# Patient Record
Sex: MALE | Race: WHITE | NOT HISPANIC OR LATINO | Employment: FULL TIME | URBAN - METROPOLITAN AREA
[De-identification: names, ages, dates, MRNs, and addresses within clinical notes are randomized per-mention and may not be internally consistent; named-entity substitution may affect disease eponyms.]

---

## 2017-05-24 ENCOUNTER — OFFICE VISIT - HEALTHEAST (OUTPATIENT)
Dept: FAMILY MEDICINE | Facility: CLINIC | Age: 42
End: 2017-05-24

## 2017-05-24 DIAGNOSIS — B35.1 ONYCHOMYCOSIS: ICD-10-CM

## 2017-05-24 DIAGNOSIS — L50.9 HIVES: ICD-10-CM

## 2017-05-24 ASSESSMENT — MIFFLIN-ST. JEOR: SCORE: 2220.95

## 2017-05-27 ENCOUNTER — COMMUNICATION - HEALTHEAST (OUTPATIENT)
Dept: FAMILY MEDICINE | Facility: CLINIC | Age: 42
End: 2017-05-27

## 2017-06-01 ENCOUNTER — COMMUNICATION - HEALTHEAST (OUTPATIENT)
Dept: FAMILY MEDICINE | Facility: CLINIC | Age: 42
End: 2017-06-01

## 2017-06-01 DIAGNOSIS — M54.50 LOWER BACK PAIN: ICD-10-CM

## 2017-06-01 DIAGNOSIS — Z91.09 ENVIRONMENTAL ALLERGIES: ICD-10-CM

## 2017-06-08 ENCOUNTER — OFFICE VISIT - HEALTHEAST (OUTPATIENT)
Dept: ALLERGY | Facility: CLINIC | Age: 42
End: 2017-06-08

## 2017-06-08 ENCOUNTER — RECORDS - HEALTHEAST (OUTPATIENT)
Dept: ADMINISTRATIVE | Facility: OTHER | Age: 42
End: 2017-06-08

## 2017-06-08 DIAGNOSIS — R05.9 COUGH: ICD-10-CM

## 2017-06-08 DIAGNOSIS — R10.9 ABDOMINAL PAIN: ICD-10-CM

## 2017-06-08 DIAGNOSIS — L50.9 URTICARIA, UNSPECIFIED: ICD-10-CM

## 2017-06-08 LAB
CREAT SERPL-MCNC: 1.06 MG/DL (ref 0.7–1.3)
GFR SERPL CREATININE-BSD FRML MDRD: >60 ML/MIN/1.73M2

## 2017-06-08 ASSESSMENT — MIFFLIN-ST. JEOR: SCORE: 2189.2

## 2017-06-12 ENCOUNTER — COMMUNICATION - HEALTHEAST (OUTPATIENT)
Dept: ALLERGY | Facility: CLINIC | Age: 42
End: 2017-06-12

## 2017-06-12 ENCOUNTER — AMBULATORY - HEALTHEAST (OUTPATIENT)
Dept: ALLERGY | Facility: CLINIC | Age: 42
End: 2017-06-12

## 2017-06-12 DIAGNOSIS — E55.9 VITAMIN D DEFICIENCY: ICD-10-CM

## 2017-06-12 LAB
GLIADIN IGA SER-ACNC: 2.1 U/ML
GLIADIN IGG SER-ACNC: <0.4 U/ML
IGA SERPL-MCNC: 292 MG/DL (ref 65–400)
TTG IGA SER-ACNC: 0.5 U/ML
TTG IGG SER-ACNC: <0.6 U/ML

## 2017-06-14 ENCOUNTER — COMMUNICATION - HEALTHEAST (OUTPATIENT)
Dept: FAMILY MEDICINE | Facility: CLINIC | Age: 42
End: 2017-06-14

## 2017-06-21 ENCOUNTER — COMMUNICATION - HEALTHEAST (OUTPATIENT)
Dept: ALLERGY | Facility: CLINIC | Age: 42
End: 2017-06-21

## 2018-03-28 ENCOUNTER — COMMUNICATION - HEALTHEAST (OUTPATIENT)
Dept: FAMILY MEDICINE | Facility: CLINIC | Age: 43
End: 2018-03-28

## 2018-04-06 ENCOUNTER — RECORDS - HEALTHEAST (OUTPATIENT)
Dept: ADMINISTRATIVE | Facility: OTHER | Age: 43
End: 2018-04-06

## 2018-07-24 ENCOUNTER — RECORDS - HEALTHEAST (OUTPATIENT)
Dept: ADMINISTRATIVE | Facility: OTHER | Age: 43
End: 2018-07-24

## 2019-04-17 ENCOUNTER — OFFICE VISIT - HEALTHEAST (OUTPATIENT)
Dept: FAMILY MEDICINE | Facility: CLINIC | Age: 44
End: 2019-04-17

## 2019-04-17 ENCOUNTER — HOSPITAL ENCOUNTER (OUTPATIENT)
Dept: LAB | Age: 44
Setting detail: SPECIMEN
Discharge: HOME | End: 2019-04-17

## 2019-04-17 DIAGNOSIS — E66.812 CLASS 2 OBESITY DUE TO EXCESS CALORIES WITHOUT SERIOUS COMORBIDITY WITH BODY MASS INDEX (BMI) OF 38.0 TO 38.9 IN ADULT: ICD-10-CM

## 2019-04-17 DIAGNOSIS — Z01.818 PREOP GENERAL PHYSICAL EXAM: ICD-10-CM

## 2019-04-17 DIAGNOSIS — E66.09 CLASS 2 OBESITY DUE TO EXCESS CALORIES WITHOUT SERIOUS COMORBIDITY WITH BODY MASS INDEX (BMI) OF 38.0 TO 38.9 IN ADULT: ICD-10-CM

## 2019-04-17 DIAGNOSIS — B35.1 ONYCHOMYCOSIS DUE TO DERMATOPHYTE: ICD-10-CM

## 2019-04-17 DIAGNOSIS — I10 ESSENTIAL HYPERTENSION: ICD-10-CM

## 2019-04-17 DIAGNOSIS — H26.9 CATARACT OF BOTH EYES, UNSPECIFIED CATARACT TYPE: ICD-10-CM

## 2019-04-17 LAB
ALBUMIN SERPL-MCNC: 4 G/DL (ref 3.5–5)
ALBUMIN UR-MCNC: NEGATIVE MG/DL
ALP SERPL-CCNC: 62 U/L (ref 45–120)
ALT SERPL W P-5'-P-CCNC: 28 U/L (ref 0–45)
ANION GAP SERPL CALCULATED.3IONS-SCNC: 10 MMOL/L (ref 5–18)
APPEARANCE UR: CLEAR
AST SERPL W P-5'-P-CCNC: 18 U/L (ref 0–40)
BASOPHILS # BLD AUTO: 0.1 THOU/UL (ref 0–0.2)
BASOPHILS NFR BLD AUTO: 1 % (ref 0–2)
BILIRUB SERPL-MCNC: 0.9 MG/DL (ref 0–1)
BILIRUB UR QL STRIP: NEGATIVE
BUN SERPL-MCNC: 11 MG/DL (ref 8–22)
CALCIUM SERPL-MCNC: 9.6 MG/DL (ref 8.5–10.5)
CHLORIDE BLD-SCNC: 104 MMOL/L (ref 98–107)
CO2 SERPL-SCNC: 26 MMOL/L (ref 22–31)
COLOR UR AUTO: YELLOW
CREAT SERPL-MCNC: 0.9 MG/DL (ref 0.7–1.3)
CREAT UR-MCNC: 141.4 MG/DL
EOSINOPHIL # BLD AUTO: 0.2 THOU/UL (ref 0–0.4)
EOSINOPHIL NFR BLD AUTO: 3 % (ref 0–6)
ERYTHROCYTE [DISTWIDTH] IN BLOOD BY AUTOMATED COUNT: 10.7 % (ref 11–14.5)
GFR SERPL CREATININE-BSD FRML MDRD: >60 ML/MIN/1.73M2
GLUCOSE BLD-MCNC: 89 MG/DL (ref 70–125)
GLUCOSE UR STRIP-MCNC: NEGATIVE MG/DL
HBA1C MFR BLD: 5.4 % (ref 3.5–6)
HCT VFR BLD AUTO: 48.6 % (ref 40–54)
HGB BLD-MCNC: 16.7 G/DL (ref 14–18)
HGB UR QL STRIP: NEGATIVE
KETONES UR STRIP-MCNC: NEGATIVE MG/DL
LEUKOCYTE ESTERASE UR QL STRIP: NEGATIVE
LYMPHOCYTES # BLD AUTO: 1.6 THOU/UL (ref 0.8–4.4)
LYMPHOCYTES NFR BLD AUTO: 20 % (ref 20–40)
MCH RBC QN AUTO: 34 PG (ref 27–34)
MCHC RBC AUTO-ENTMCNC: 34.4 G/DL (ref 32–36)
MCV RBC AUTO: 99 FL (ref 80–100)
MICROALBUMIN UR-MCNC: 1.48 MG/DL (ref 0–1.99)
MICROALBUMIN/CREAT UR: 10.5 MG/G
MONOCYTES # BLD AUTO: 0.6 THOU/UL (ref 0–0.9)
MONOCYTES NFR BLD AUTO: 8 % (ref 2–10)
NEUTROPHILS # BLD AUTO: 5.6 THOU/UL (ref 2–7.7)
NEUTROPHILS NFR BLD AUTO: 69 % (ref 50–70)
NITRATE UR QL: NEGATIVE
PH UR STRIP: 6 [PH] (ref 5–8)
PLATELET # BLD AUTO: 287 THOU/UL (ref 140–440)
PMV BLD AUTO: 6.7 FL (ref 7–10)
POTASSIUM BLD-SCNC: 4.2 MMOL/L (ref 3.5–5)
PROT SERPL-MCNC: 7.2 G/DL (ref 6–8)
RBC # BLD AUTO: 4.92 MILL/UL (ref 4.4–6.2)
SODIUM SERPL-SCNC: 140 MMOL/L (ref 136–145)
SP GR UR STRIP: 1.02 (ref 1–1.03)
TSH SERPL DL<=0.005 MIU/L-ACNC: 1.04 UIU/ML (ref 0.3–5)
UROBILINOGEN UR STRIP-ACNC: NORMAL
WBC: 8 THOU/UL (ref 4–11)

## 2019-04-17 ASSESSMENT — MIFFLIN-ST. JEOR: SCORE: 2285.02

## 2019-04-18 ENCOUNTER — COMMUNICATION - HEALTHEAST (OUTPATIENT)
Dept: FAMILY MEDICINE | Facility: CLINIC | Age: 44
End: 2019-04-18

## 2019-04-18 LAB
ATRIAL RATE - MUSE: 73 BPM
DIASTOLIC BLOOD PRESSURE - MUSE: NORMAL MMHG
INTERPRETATION ECG - MUSE: NORMAL
P AXIS - MUSE: 6 DEGREES
PR INTERVAL - MUSE: 140 MS
QRS DURATION - MUSE: 82 MS
QT - MUSE: 414 MS
QTC - MUSE: 456 MS
R AXIS - MUSE: -7 DEGREES
SYSTOLIC BLOOD PRESSURE - MUSE: NORMAL MMHG
T AXIS - MUSE: 2 DEGREES
VENTRICULAR RATE- MUSE: 73 BPM

## 2019-05-01 ENCOUNTER — OFFICE VISIT - HEALTHEAST (OUTPATIENT)
Dept: FAMILY MEDICINE | Facility: CLINIC | Age: 44
End: 2019-05-01

## 2019-05-01 DIAGNOSIS — I10 ESSENTIAL HYPERTENSION: ICD-10-CM

## 2019-05-03 ENCOUNTER — COMMUNICATION - HEALTHEAST (OUTPATIENT)
Dept: FAMILY MEDICINE | Facility: CLINIC | Age: 44
End: 2019-05-03

## 2019-05-20 ENCOUNTER — OFFICE VISIT - HEALTHEAST (OUTPATIENT)
Dept: FAMILY MEDICINE | Facility: CLINIC | Age: 44
End: 2019-05-20

## 2019-05-20 DIAGNOSIS — I10 BENIGN ESSENTIAL HYPERTENSION: ICD-10-CM

## 2019-09-26 ENCOUNTER — COMMUNICATION - HEALTHEAST (OUTPATIENT)
Dept: FAMILY MEDICINE | Facility: CLINIC | Age: 44
End: 2019-09-26

## 2019-09-26 DIAGNOSIS — I10 ESSENTIAL HYPERTENSION: ICD-10-CM

## 2020-02-17 ENCOUNTER — OFFICE VISIT (OUTPATIENT)
Dept: URGENT CARE | Facility: URGENT CARE | Age: 45
End: 2020-02-17
Payer: COMMERCIAL

## 2020-02-17 VITALS
HEIGHT: 75 IN | DIASTOLIC BLOOD PRESSURE: 106 MMHG | TEMPERATURE: 102.8 F | HEART RATE: 123 BPM | BODY MASS INDEX: 35.93 KG/M2 | OXYGEN SATURATION: 99 % | SYSTOLIC BLOOD PRESSURE: 156 MMHG | WEIGHT: 289 LBS

## 2020-02-17 DIAGNOSIS — J10.1 INFLUENZA A: Primary | ICD-10-CM

## 2020-02-17 PROCEDURE — 99203 OFFICE O/P NEW LOW 30 MIN: CPT | Performed by: FAMILY MEDICINE

## 2020-02-17 RX ORDER — OSELTAMIVIR PHOSPHATE 75 MG/1
75 CAPSULE ORAL 2 TIMES DAILY
Qty: 10 CAPSULE | Refills: 0 | Status: SHIPPED | OUTPATIENT
Start: 2020-02-17 | End: 2020-02-22

## 2020-02-17 RX ORDER — IBUPROFEN 200 MG
200 TABLET ORAL EVERY 4 HOURS PRN
COMMUNITY
End: 2021-08-11

## 2020-02-17 ASSESSMENT — MIFFLIN-ST. JEOR: SCORE: 2281.53

## 2020-02-18 NOTE — PROGRESS NOTES
Subjective: Patient got sick 2 days ago with high fevers and cough and aches, [facility] where they tested him negative for strep and negative for influenza and recommended he come here.  He feels pretty poorly but his temperature has come down.  He lives alone.  He was recently in [place] and was around people who seem to have the flu.    Objective: NAD.  Vitals are stable.  ENT is normal.  Neck is normal.  Lungs are clear.  Heart is regular without murmurs.  Abdomen benign.    Assessment and plan: These of the symptoms him for influenza A that we are seeing everybody coming in with today.  I think he should take Tamiflu.  I think the [facility] was a false negative.  Discussed what to expect.

## 2021-05-27 NOTE — PROGRESS NOTES
Preoperative Exam    Scheduled Procedure: Bilateral Cataract  Surgery Date:  5/1/2019   Surgery Location: [facility] Fax: [phone]    Surgeon:  Dr. Nelson    Assessment/Plan:     1. Preop general physical exam    - HM1(CBC and Differential)  - HM1 (CBC with Diff)    2. Cataract of both eyes, unspecified cataract type  Scheduled for May 1, but patient will postpone to the second week of May for better blood pressure control.    3. Essential hypertension  Started on lisinopril 10 mg daily, check blood pressure 3-4 times a week, follow-up in about 2 or 3 weeks.  - Comprehensive Metabolic Panel  - Thyroid Cascade  - Microalbumin, Random Urine  - Urinalysis-UC if Indicated  - Electrocardiogram Perform - Clinic; Future  - lisinopril (PRINIVIL,ZESTRIL) 10 MG tablet; Take 1 tablet (10 mg total) by mouth daily.  Dispense: 90 tablet; Refill: 3  - Electrocardiogram Perform - Clinic    4. Class 2 obesity due to excess calories without serious comorbidity with body mass index (BMI) of 38.0 to 38.9 in adult  - Glycosylated Hemoglobin A1c  - Thyroid Cascade    5. Onychomycosis due to dermatophyte  - ciclopirox (PENLAC) 8 % solution; Apply over nail and surrounding skin. Apply daily over previous coat. After seven (7) days, may remove with alcohol and continue cycle.  Dispense: 6.6 mL; Refill: 3     Left ventricular hypertrophy on EKG done here and independently reviewed, likely related to chronic hypertension.    Surgical Procedure Risk: Low (reported cardiac risk generally < 1%)  Have you had prior anesthesia?: Yes  Have you or any family members had a previous anesthesia reaction:  No  Do you or any family members have a history of a clotting or bleeding disorder?: No  Cardiac Risk Assessment: no increased risk for major cardiac complications    Patient is not approved for surgery Because of blood pressure elevation, he was started on lisinopril 10 mg daily, we will follow-up in about 2 to 3 weeks for a  recheck      Functional Status: Independent  Patient plans to recover at home alone.     Subjective:      Vikas Lees is a 43 y.o. male who presents for a preoperative consultation.  Scheduled to have bilateral cataract surgery May 1, as noticed progressive decreased vision over time.  But blood pressure moderately elevated with a new diagnosis of hypertension, no other symptoms.  Would like to resume ciclopirox for onychomycosis topical treatment.    All other systems reviewed and are negative, other than those listed in the HPI.    Pertinent History  Do you have difficulty breathing or chest pain after walking up a flight of stairs: No  History of obstructive sleep apnea: No  Steroid use in the last 6 months: No  Frequent Aspirin/NSAID use: No  Prior Blood Transfusion: No  Prior Blood Transfusion Reaction: No  If for some reason prior to, during or after the procedure, if it is medically indicated, would you be willing to have a blood transfusion?:  There is no transfusion refusal.    Current Outpatient Medications   Medication Sig Dispense Refill     albuterol (PROAIR HFA;PROVENTIL HFA;VENTOLIN HFA) 90 mcg/actuation inhaler Inhale 2 puffs every 6 (six) hours as needed for wheezing. 1 Inhaler 0     ibuprofen (ADVIL,MOTRIN) 200 MG tablet Take 200 mg by mouth every 6 (six) hours as needed for pain.       multivitamin with minerals (THERA-M) 9 mg iron-400 mcg Tab tablet Take 1 tablet by mouth daily.       ciclopirox (PENLAC) 8 % solution Apply over nail and surrounding skin. Apply daily over previous coat. After seven (7) days, may remove with alcohol and continue cycle. 6.6 mL 3     diphenhydrAMINE (BENADRYL) 25 mg capsule Take 25 mg by mouth every 6 (six) hours as needed for itching.       lisinopril (PRINIVIL,ZESTRIL) 10 MG tablet Take 1 tablet (10 mg total) by mouth daily. 90 tablet 3     mometasone-formoterol (DULERA) 100-5 mcg/actuation HFAA inhaler Inhale 2 puffs 2 (two) times a day. 1 Inhaler 1     No  "current facility-administered medications for this visit.         No Known Allergies    Patient Active Problem List   Diagnosis     Mild Single Episode Major Depression       No past medical history on file.    Past Surgical History:   Procedure Laterality Date     INGUINAL HERNIA REPAIR         Social History     Socioeconomic History     Marital status: Single     Spouse name: Not on file     Number of children: Not on file     Years of education: Not on file     Highest education level: Not on file   Occupational History     Not on file   Social Needs     Financial resource strain: Not on file     Food insecurity:     Worry: Not on file     Inability: Not on file     Transportation needs:     Medical: Not on file     Non-medical: Not on file   Tobacco Use     Smoking status: Never Smoker     Smokeless tobacco: Never Used   Substance and Sexual Activity     Alcohol use: Not on file     Drug use: Not on file     Sexual activity: Not on file   Lifestyle     Physical activity:     Days per week: Not on file     Minutes per session: Not on file     Stress: Not on file   Relationships     Social connections:     Talks on phone: Not on file     Gets together: Not on file     Attends Buddhist service: Not on file     Active member of club or organization: Not on file     Attends meetings of clubs or organizations: Not on file     Relationship status: Not on file     Intimate partner violence:     Fear of current or ex partner: Not on file     Emotionally abused: Not on file     Physically abused: Not on file     Forced sexual activity: Not on file   Other Topics Concern     Not on file   Social History Narrative     Not on file       Patient Care Team:  Ranjeet Franco MD as PCP - General (Family Medicine)          Objective:     Vitals:    04/17/19 1021   BP: (!) 145/100   Pulse: 83   Temp: 98.5  F (36.9  C)   TempSrc: Oral   SpO2: 97%   Weight: (!) 297 lb (134.7 kg)   Height: 6' 1.25\" (1.861 m) "         Physical Exam:  Physical Examination: General appearance - alert, well appearing, and in no distress  Mental status - alert, oriented to person, place, and time  Eyes -opacification of the lens bilateral, otherwise pupils equal and reactive, extraocular eye movements intact  Ears - bilateral TM's and external ear canals normal  Nose - normal and patent, no erythema, discharge or polyps  Mouth - mucous membranes moist, pharynx normal without lesions  Neck - supple, no significant adenopathy  Lymphatics - no palpable lymphadenopathy, no hepatosplenomegaly  Chest - clear to auscultation, no wheezes, rales or rhonchi, symmetric air entry  Heart - normal rate, regular rhythm, normal S1, S2, no murmurs, rubs, clicks or gallops  Abdomen - soft, nontender, nondistended, no masses or organomegaly  Back exam - full range of motion, no tenderness, palpable spasm or pain on motion  Neurological - alert, oriented, normal speech, no focal findings or movement disorder noted  Musculoskeletal - no joint tenderness, deformity or swelling  Extremities - peripheral pulses normal, no pedal edema, no clubbing or cyanosis  Skin -onychomycosis of toenails, normal coloration and turgor, no rashes, no suspicious skin lesions noted    There are no Patient Instructions on file for this visit.    EKG: Independently reviewed normal sinus rhythm, with left ventricular hypertrophy changes.    Labs:  Recent Results (from the past 120 hour(s))   Electrocardiogram Perform - Clinic    Collection Time: 04/17/19 11:19 AM   Result Value Ref Range    SYSTOLIC BLOOD PRESSURE  mmHg    DIASTOLIC BLOOD PRESSURE  mmHg    VENTRICULAR RATE 73 BPM    ATRIAL RATE 73 BPM    P-R INTERVAL 140 ms    QRS DURATION 82 ms    Q-T INTERVAL 414 ms    QTC CALCULATION (BEZET) 456 ms    P Axis 6 degrees    R AXIS -7 degrees    T AXIS 2 degrees    MUSE DIAGNOSIS       Normal sinus rhythm  Voltage criteria for left ventricular hypertrophy  Abnormal ECG  No previous ECGs  available  Confirmed by BERONICA MCKINLEY MD LOC:[facility] ([id number]) on 4/18/2019 12:04:01 PM     Glycosylated Hemoglobin A1c    Collection Time: 04/17/19 11:34 AM   Result Value Ref Range    Hemoglobin A1c 5.4 3.5 - 6.0 %   Comprehensive Metabolic Panel    Collection Time: 04/17/19 11:34 AM   Result Value Ref Range    Sodium 140 136 - 145 mmol/L    Potassium 4.2 3.5 - 5.0 mmol/L    Chloride 104 98 - 107 mmol/L    CO2 26 22 - 31 mmol/L    Anion Gap, Calculation 10 5 - 18 mmol/L    Glucose 89 70 - 125 mg/dL    BUN 11 8 - 22 mg/dL    Creatinine 0.90 0.70 - 1.30 mg/dL    GFR MDRD Af Amer >60 >60 mL/min/1.73m2    GFR MDRD Non Af Amer >60 >60 mL/min/1.73m2    Bilirubin, Total 0.9 0.0 - 1.0 mg/dL    Calcium 9.6 8.5 - 10.5 mg/dL    Protein, Total 7.2 6.0 - 8.0 g/dL    Albumin 4.0 3.5 - 5.0 g/dL    Alkaline Phosphatase 62 45 - 120 U/L    AST 18 0 - 40 U/L    ALT 28 0 - 45 U/L   Thyroid [place]    Collection Time: 04/17/19 11:34 AM   Result Value Ref Range    TSH 1.04 0.30 - 5.00 uIU/mL   HM1 (CBC with Diff)    Collection Time: 04/17/19 11:34 AM   Result Value Ref Range    WBC 8.0 4.0 - 11.0 thou/uL    RBC 4.92 4.40 - 6.20 mill/uL    Hemoglobin 16.7 14.0 - 18.0 g/dL    Hematocrit 48.6 40.0 - 54.0 %    MCV 99 80 - 100 fL    MCH 34.0 27.0 - 34.0 pg    MCHC 34.4 32.0 - 36.0 g/dL    RDW 10.7 (L) 11.0 - 14.5 %    Platelets 287 140 - 440 thou/uL    MPV 6.7 (L) 7.0 - 10.0 fL    Neutrophils % 69 50 - 70 %    Lymphocytes % 20 20 - 40 %    Monocytes % 8 2 - 10 %    Eosinophils % 3 0 - 6 %    Basophils % 1 0 - 2 %    Neutrophils Absolute 5.6 2.0 - 7.7 thou/uL    Lymphocytes Absolute 1.6 0.8 - 4.4 thou/uL    Monocytes Absolute 0.6 0.0 - 0.9 thou/uL    Eosinophils Absolute 0.2 0.0 - 0.4 thou/uL    Basophils Absolute 0.1 0.0 - 0.2 thou/uL   Microalbumin, Random Urine    Collection Time: 04/17/19 11:43 AM   Result Value Ref Range    Microalbumin, Random Urine 1.48 0.00 - 1.99 mg/dL    Creatinine, Urine 141.4 mg/dL    Microalbumin/Creatinine Ratio  Random Urine 10.5 <=19.9 mg/g   Urinalysis-UC if Indicated    Collection Time: 04/17/19 11:43 AM   Result Value Ref Range    Color, UA Yellow Colorless, Yellow, Straw, Light Yellow    Clarity, UA Clear Clear    Glucose, UA Negative Negative    Bilirubin, UA Negative Negative    Ketones, UA Negative Negative    Specific Gravity, UA 1.020 1.005 - 1.030    Blood, UA Negative Negative    pH, UA 6.0 5.0 - 8.0    Protein, UA Negative Negative mg/dL    Urobilinogen, UA 0.2 E.U./dL 0.2 E.U./dL, 1.0 E.U./dL    Nitrite, UA Negative Negative    Leukocytes, UA Negative Negative         There is no immunization history on file for this patient.        Electronically signed by Ranjeet Franco MD 04/18/19 10:15 AM

## 2021-05-28 NOTE — PROGRESS NOTES
OFFICE VISIT - FAMILY MEDICINE     ASSESSMENT AND PLAN     1. Essential hypertension  losartan-hydrochlorothiazide (HYZAAR) 50-12.5 mg per tablet   Uncontrolled hypertension, recent URI with cough, will discontinue lisinopril, start losartan HCTZ, possible side effect discussed, check blood pressure 3-4 times a week, keep hydrated, follow-up in about 3 or 4 weeks for a recheck.    CHIEF COMPLAINT   Hypertension (follow up)    HPI   Vikas Lees is a 43 y.o. male.  No MIIC - Needs TDAP     He was started on lisinopril 10 mg about 3 weeks ago for hypertension, has been taken every day, but blood pressure has not been good control, he did have cold symptoms  recently with cough with some concern about possible lisinopril induced cough.  Denies any chest pain, shortness of breath or dizziness.  He was planning to have his cataract surgery done next week based on blood pressure reading.      Review of Systems As per HPI, otherwise negative.    OBJECTIVE   BP (!) 145/95 (Patient Site: Right Arm, Patient Position: Sitting, Cuff Size: Adult Large)   Pulse 91   Wt (!) 295 lb 8 oz (134 kg)   SpO2 98%   BMI 38.72 kg/m    Physical Exam   Constitutional: He is oriented to person, place, and time. He appears well-developed and well-nourished.   HENT:   Head: Normocephalic and atraumatic.   Neck: Normal range of motion. Neck supple. No JVD present. No tracheal deviation present. No thyromegaly present.   Cardiovascular: Normal rate, regular rhythm, normal heart sounds and intact distal pulses. Exam reveals no gallop and no friction rub.   No murmur heard.  Pulmonary/Chest: Effort normal and breath sounds normal. No respiratory distress. He has no wheezes. He has no rales.   Musculoskeletal: He exhibits no edema or tenderness.   Lymphadenopathy:     He has no cervical adenopathy.   Neurological: He is alert and oriented to person, place, and time. Coordination normal.   Psychiatric: He has a normal mood and affect. Judgment  and thought content normal.       PFSH     Family History   Problem Relation Age of Onset     Arthritis Mother      Breast cancer Mother      Stroke Mother      Diabetes Maternal Grandfather      Stroke Maternal Grandfather      Social History     Socioeconomic History     Marital status: Single     Spouse name: Not on file     Number of children: Not on file     Years of education: Not on file     Highest education level: Not on file   Occupational History     Not on file   Social Needs     Financial resource strain: Not on file     Food insecurity:     Worry: Not on file     Inability: Not on file     Transportation needs:     Medical: Not on file     Non-medical: Not on file   Tobacco Use     Smoking status: Never Smoker     Smokeless tobacco: Never Used   Substance and Sexual Activity     Alcohol use: Not on file     Drug use: Not on file     Sexual activity: Not on file   Lifestyle     Physical activity:     Days per week: Not on file     Minutes per session: Not on file     Stress: Not on file   Relationships     Social connections:     Talks on phone: Not on file     Gets together: Not on file     Attends Yarsanism service: Not on file     Active member of club or organization: Not on file     Attends meetings of clubs or organizations: Not on file     Relationship status: Not on file     Intimate partner violence:     Fear of current or ex partner: Not on file     Emotionally abused: Not on file     Physically abused: Not on file     Forced sexual activity: Not on file   Other Topics Concern     Not on file   Social History Narrative     Not on file     Relevant history was reviewed with the patient today, unless noted in HPI, nothing is pertinent for this visit.  PMS     Patient Active Problem List    Diagnosis Date Noted     Mild Single Episode Major Depression      Overview Note:     Created by Conversion         Past Surgical History:   Procedure Laterality Date     INGUINAL HERNIA REPAIR          RESULTS/CONSULTS (Lab/Rad)   No results found for this or any previous visit (from the past 168 hour(s)).  No results found.  MEDICATIONS     Current Outpatient Medications on File Prior to Visit   Medication Sig Dispense Refill     albuterol (PROAIR HFA;PROVENTIL HFA;VENTOLIN HFA) 90 mcg/actuation inhaler Inhale 2 puffs every 6 (six) hours as needed for wheezing. 1 Inhaler 0     ciclopirox (PENLAC) 8 % solution Apply over nail and surrounding skin. Apply daily over previous coat. After seven (7) days, may remove with alcohol and continue cycle. 6.6 mL 3     multivitamin with minerals (THERA-M) 9 mg iron-400 mcg Tab tablet Take 1 tablet by mouth daily.       diphenhydrAMINE (BENADRYL) 25 mg capsule Take 25 mg by mouth every 6 (six) hours as needed for itching.       ibuprofen (ADVIL,MOTRIN) 200 MG tablet Take 200 mg by mouth every 6 (six) hours as needed for pain.       mometasone-formoterol (DULERA) 100-5 mcg/actuation HFAA inhaler Inhale 2 puffs 2 (two) times a day. 1 Inhaler 1     No current facility-administered medications on file prior to visit.        HEALTH MAINTENANCE / SCREENING   PHQ-2 Total Score: 0 (4/17/2019 11:00 AM)  , PHQ-9 Total Score: 2 (4/17/2019 11:00 AM)  ,ANDRES 7 Total Score: 0 (4/17/2019 11:00 AM)      There is no immunization history on file for this patient.  Health Maintenance   Topic     TDAP ADULT ONE TIME DOSE      DEPRESSION FOLLOW UP      ADVANCE DIRECTIVES DISCUSSED WITH PATIENT      TD 18+ HE      INFLUENZA VACCINE RULE BASED        Ranjeet Franco MD  Family Medicine, [facility]     This note was dictated using a voice recognition software.  Any grammatical or context distortion are unintentional and inherent to the software.

## 2021-05-28 NOTE — PROGRESS NOTES
OFFICE VISIT - FAMILY MEDICINE     ASSESSMENT AND PLAN     1. Benign essential hypertension     Benign essential hypertension, controlled with Hyzaar 50-12.5 mg daily, blood pressure slowly improving, continue current management continue healthy lifestyle changes weight loss program, okay for cataract surgery, follow-up in 1 month after the procedures for a general physical exam and blood pressure recheck.    CHIEF COMPLAINT   Follow-up (hypertension)    HPI   Vikas Lees is a 43 y.o. male.  No MIIC - Needs TDAP  Following up on hypertension, initially started on lisinopril 10 mg, but no improvement of blood pressure reading, lisinopril was discontinued, patient is currently taking losartan 50-12.5 mg daily, his blood pressure is coming down.  Denies any headaches dizziness or chest pain.  Scheduled for cataract surgery in a week, has already had his preop physical.    Review of Systems As per HPI, otherwise negative.    OBJECTIVE   /85   Pulse 82   Wt (!) 296 lb 12 oz (134.6 kg)   SpO2 97%   BMI 38.88 kg/m    Physical Exam   Constitutional: He is oriented to person, place, and time. He appears well-developed and well-nourished.   HENT:   Head: Normocephalic and atraumatic.   Neck: Normal range of motion. Neck supple. No JVD present. No tracheal deviation present. No thyromegaly present.   Cardiovascular: Normal rate, regular rhythm, normal heart sounds and intact distal pulses. Exam reveals no gallop and no friction rub.   No murmur heard.  Pulmonary/Chest: Effort normal and breath sounds normal. No respiratory distress. He has no wheezes. He has no rales.   Musculoskeletal: He exhibits no edema or tenderness.   Lymphadenopathy:     He has no cervical adenopathy.   Neurological: He is alert and oriented to person, place, and time. Coordination normal.   Psychiatric: He has a normal mood and affect. Judgment and thought content normal.       PFSH     Family History   Problem Relation Age of Onset      Arthritis Mother      Breast cancer Mother      Stroke Mother      Diabetes Maternal Grandfather      Stroke Maternal Grandfather      Social History     Socioeconomic History     Marital status: Single     Spouse name: Not on file     Number of children: Not on file     Years of education: Not on file     Highest education level: Not on file   Occupational History     Not on file   Social Needs     Financial resource strain: Not on file     Food insecurity:     Worry: Not on file     Inability: Not on file     Transportation needs:     Medical: Not on file     Non-medical: Not on file   Tobacco Use     Smoking status: Never Smoker     Smokeless tobacco: Never Used   Substance and Sexual Activity     Alcohol use: Not on file     Drug use: Not on file     Sexual activity: Not on file   Lifestyle     Physical activity:     Days per week: Not on file     Minutes per session: Not on file     Stress: Not on file   Relationships     Social connections:     Talks on phone: Not on file     Gets together: Not on file     Attends [employer]an service: Not on file     Active member of club or organization: Not on file     Attends meetings of clubs or organizations: Not on file     Relationship status: Not on file     Intimate partner violence:     Fear of current or ex partner: Not on file     Emotionally abused: Not on file     Physically abused: Not on file     Forced sexual activity: Not on file   Other Topics Concern     Not on file   Social History Narrative     Not on file     Relevant history was reviewed with the patient today, unless noted in HPI, nothing is pertinent for this visit.  [facility]     Patient Active Problem List    Diagnosis Date Noted     Benign essential hypertension 05/20/2019     Mild Single Episode Major Depression      Overview Note:     Created by Conversion         Past Surgical History:   Procedure Laterality Date     INGUINAL HERNIA REPAIR         RESULTS/CONSULTS (Lab/Rad)   No results found for this  or any previous visit (from the past 168 hour(s)).  No results found.  MEDICATIONS     Current Outpatient Medications on File Prior to Visit   Medication Sig Dispense Refill     albuterol (PROAIR HFA;PROVENTIL HFA;VENTOLIN HFA) 90 mcg/actuation inhaler Inhale 2 puffs every 6 (six) hours as needed for wheezing. 1 Inhaler 0     ciclopirox (PENLAC) 8 % solution Apply over nail and surrounding skin. Apply daily over previous coat. After seven (7) days, may remove with alcohol and continue cycle. 6.6 mL 3     diphenhydrAMINE (BENADRYL) 25 mg capsule Take 25 mg by mouth every 6 (six) hours as needed for itching.       ibuprofen (ADVIL,MOTRIN) 200 MG tablet Take 200 mg by mouth every 6 (six) hours as needed for pain.       losartan-hydrochlorothiazide (HYZAAR) 50-12.5 mg per tablet Take 1 tablet by mouth daily. 30 tablet 3     mometasone-formoterol (DULERA) 100-5 mcg/actuation HFAA inhaler Inhale 2 puffs 2 (two) times a day. 1 Inhaler 1     multivitamin with minerals (THERA-M) 9 mg iron-400 mcg Tab tablet Take 1 tablet by mouth daily.       No current facility-administered medications on file prior to visit.        HEALTH MAINTENANCE / SCREENING   PHQ-2 Total Score: 0 (4/17/2019 11:00 AM)  , PHQ-9 Total Score: 2 (4/17/2019 11:00 AM)  ,ANDRES 7 Total Score: 0 (4/17/2019 11:00 AM)      There is no immunization history on file for this patient.  Health Maintenance   Topic     TDAP ADULT ONE TIME DOSE      DEPRESSION FOLLOW UP      ADVANCE DIRECTIVES DISCUSSED WITH PATIENT      TD 18+ HE      INFLUENZA VACCINE RULE BASED        Ranjeet Franco MD  Family Medicine, [facility]     This note was dictated using a voice recognition software.  Any grammatical or context distortion are unintentional and inherent to the software.

## 2021-05-28 NOTE — TELEPHONE ENCOUNTER
Informed melissa gloria PCP is out of clinic today and will return Monday. She states can wait until PCP returns. Thank you.

## 2021-05-28 NOTE — TELEPHONE ENCOUNTER
Orders being requested: if pt is ready for surgery following his b/p follow up exam on 5.1.2019  Reason service is needed/diagnosis: [facility] looking to see if they should schedule surgery for pt  When are orders needed by: when available.  Where to send Orders: Fax: [phone]  Okay to leave detailed message?  Yes

## 2021-05-28 NOTE — TELEPHONE ENCOUNTER
Spoke with [facility] post-poned surgery due to elevated BP. Patient's follow-up appointment is 5/20/19 with Dr Franco, they will re-schedule surgery after follow-ip.

## 2021-05-28 NOTE — TELEPHONE ENCOUNTER
Blood pressure was still elevated at his follow-up last week, plan was to readjust his medication, postpone the surgery until the end of May and see he patient 1 week before that visit.

## 2021-05-31 VITALS — HEIGHT: 74 IN | BODY MASS INDEX: 36.19 KG/M2 | WEIGHT: 282 LBS

## 2021-05-31 VITALS — HEIGHT: 74 IN | BODY MASS INDEX: 35.29 KG/M2 | WEIGHT: 275 LBS

## 2021-06-01 NOTE — TELEPHONE ENCOUNTER
Refill Approved    Rx renewed per Medication Renewal Policy. Medication was last renewed on 5/1/19.    Yesenia Vaughan, [facility] Triage/Med Refill 9/27/2019     Requested Prescriptions   Pending Prescriptions Disp Refills     losartan-hydrochlorothiazide (HYZAAR) 50-12.5 mg per tablet [Pharmacy Med Name: LOSARTAN/HCTZ 50/12.5MG TABLETS] 30 tablet 0     Sig: TAKE 1 TABLET BY MOUTH DAILY       Diuretics/Combination Diuretics Refill Protocol  Passed - 9/26/2019  3:14 AM        Passed - Visit with PCP or prescribing provider visit in past 12 months     Last office visit with prescriber/PCP: 5/20/2019 Ranjeet Franco MD OR same dept: 5/20/2019 Ranjeet Franco MD OR same specialty: 5/20/2019 Ranjeet Franco MD  Last physical: Visit date not found Last MTM visit: Visit date not found   Next visit within 3 mo: Visit date not found  Next physical within 3 mo: Visit date not found  Prescriber OR PCP: Ranjeet Franco MD  Last diagnosis associated with med order: 1. Essential hypertension  - losartan-hydrochlorothiazide (HYZAAR) 50-12.5 mg per tablet [Pharmacy Med Name: LOSARTAN/HCTZ 50/12.5MG TABLETS]; Take 1 tablet by mouth daily.  Dispense: 30 tablet; Refill: 0    If protocol passes may refill for 12 months if within 3 months of last provider visit (or a total of 15 months).             Passed - Serum Potassium in past 12 months      Lab Results   Component Value Date    Potassium 4.2 04/17/2019             Passed - Serum Sodium in past 12 months      Lab Results   Component Value Date    Sodium 140 04/17/2019             Passed - Blood pressure on file in past 12 months     BP Readings from Last 1 Encounters:   05/20/19 144/85             Passed - Serum Creatinine in past 12 months      Creatinine   Date Value Ref Range Status   04/17/2019 0.90 0.70 - 1.30 mg/dL Final

## 2021-06-02 VITALS — WEIGHT: 297 LBS | HEIGHT: 73 IN | BODY MASS INDEX: 39.36 KG/M2

## 2021-06-03 VITALS — BODY MASS INDEX: 38.72 KG/M2 | WEIGHT: 295.5 LBS

## 2021-06-03 VITALS — WEIGHT: 296.75 LBS | BODY MASS INDEX: 38.88 KG/M2

## 2021-06-10 NOTE — PROGRESS NOTES
"ASSESSMENT & PLAN:  1. Hives  Signs and symptoms of worsening hives discussed, patient will return if he fails to improve, otherwise take prednisone taper as discussed, could also do Benadryl as needed.  - predniSONE (DELTASONE) 10 mg tablet; Take 4 tabs daily for 3 days, then 3 tabs daily for 3 days, then 2 tabs daily for 3 days, then 1 tab daily for 3 days, then stop.  Dispense: 30 tablet; Refill: 0  - IgE Allergen Panel Food ($$$)  - IgE Allergen Panel Respiratory with Total IgE ($$$)    2. Onychomycosis  - ciclopirox (PENLAC) 8 % solution; Apply over nail and surrounding skin. Apply daily over previous coat. After seven (7) days, may remove with alcohol and continue cycle.  Dispense: 6.6 mL; Refill: 3      Orders Placed This Encounter   Procedures     IgE Allergen Panel Food ($$$)     IgE Allergen Panel Respiratory with Total IgE ($$$)     There are no discontinued medications.    No Follow-up on file.    CHIEF COMPLAINT:  Chief Complaint   Patient presents with     Rash     \"HIVES\" SYSEMIC X 4 WKS - \"WORSE X 2.5 WKS\"     Foot Problem     \"NAIL FUNGUS\" X 5 YRS       HISTORY OF PRESENT ILLNESS:  Vikas is a 41 y.o. male presenting to the clinic today with complaints of a rash and nail fungus.     Rash: He has a hives like rash on his entire body. It started on the back of neck four weeks ago but has now spread to the rest of his body. It is slightly pruritic. He can still breath fine, but he feels as though it is starting to affect his mouth. He is traveling this weekend and inquires if he can takes something to calm it down. He has tried to remove things from his diet that he could be having an allergic reaction to, but he has not been able to figure out what is causing it. There is not anything that has changed in his diet or lifestyle that he can think of. He has sensitive skin but has tried changing soaps. With how systemic the rash is, he does not think it would have been anything topical that would have " "caused this. He has been taking Benadryl. He would be interested in doing some blood work to test an allergy panel.     Onychomycosis: He has a fungal infection on his toenails and inquires if there is anything he could try for it. He has used over the counter treatments, but they did not help much. His nails have started to crack. The fungus is present in all toes of his right foot and in the great toe of his left foot.     REVIEW OF SYSTEMS:   All other systems are negative.    [facility]:  He has a place in the [facility] that he is going to on Friday.    TOBACCO USE:  History   Smoking Status     Never Smoker   Smokeless Tobacco     Never Used       VITALS:  Vitals:    05/24/17 0819   BP: 134/86   Patient Site: Right Arm   Pulse: 64   Resp: 13   Temp: 97.9  F (36.6  C)   TempSrc: Oral   Weight: (!) 282 lb (127.9 kg)   Height: 6' 1.5\" (1.867 m)     Wt Readings from Last 3 Encounters:   05/24/17 (!) 282 lb (127.9 kg)   12/08/16 (!) 270 lb (122.5 kg)     Body mass index is 36.7 kg/(m^2).    PHYSICAL EXAM:  GENERAL:  Patient alert, in no acute distress.  ENT:  Oropharynx normal. Normal lips, gums and teeth.    RESP:  Clear to auscultation without crackles, wheezes or distress.  Normal respiratory effort.   CV:  Regular rate and rhythm without murmurs, rubs or gallops.    SKIN:  Diffuse hives of different sizes across entire body. Thickening of the toenails - all toes on right, great toe on left.   NEURO:  CN II-XII intact, motor & sensory function all intact.  DTR and reflexes normal.  PSYCHIATRIC:  Alert & oriented with normal mood and affect.  Good judgment and insight.      ADDITIONAL HISTORY SUMMARIZED (2): None.  DECISION TO OBTAIN EXTRA INFORMATION (1): None.   RADIOLOGY TESTS (1): None.  LABS (1): Labs ordered.   MEDICINE TESTS (1): None.  INDEPENDENT REVIEW (2 each): None.     The visit lasted a total of 12 minutes face to face with the patient. Over 50% of the time was spent counseling and educating the patient " about his rash.    I, Bandar Hou, am scribing for and in the presence of, Dr. Franco.    I, Dr. Franco, personally performed the services described in this documentation, as scribed by Bandar Hou in my presence, and it is both accurate and complete.    Dragon dictation was used for this note.  Speech recognition errors are a possibility.    MEDICATIONS:  Current Outpatient Prescriptions   Medication Sig Dispense Refill     diphenhydrAMINE (BENADRYL) 25 mg capsule Take 25 mg by mouth every 6 (six) hours as needed for itching.       ibuprofen (ADVIL,MOTRIN) 200 MG tablet Take 200 mg by mouth every 6 (six) hours as needed for pain.       ciclopirox (PENLAC) 8 % solution Apply over nail and surrounding skin. Apply daily over previous coat. After seven (7) days, may remove with alcohol and continue cycle. 6.6 mL 3     methylPREDNISolone (MEDROL DOSEPACK) 4 mg tablet follow package directions 21 tablet 0     predniSONE (DELTASONE) 10 mg tablet Take 4 tabs daily for 3 days, then 3 tabs daily for 3 days, then 2 tabs daily for 3 days, then 1 tab daily for 3 days, then stop. 30 tablet 0     No current facility-administered medications for this visit.        Total data points: 1      There are no Patient Instructions on file for this visit.

## 2021-06-11 NOTE — PROGRESS NOTES
Chief complaint: Hives, cough    Of present illness: This is a pleasant 41-year-old gentleman who is here today for evaluation of hives.  He states for the past 4 weeks he has been experiencing highs.  He states they were initially head to toe.  He states they began at night and he cannot think of any specific allergic trigger.  He denies any recent illness but states over the last week he has had an increased cough and runny nose.  He has a distant history of a pneumonia and states when he gets sick symptoms tend to go right to the chest and he would develop this cough.  He states initially by his primary care doctor he was given a steroid.  This did help and the hives continued to come and go throughout the day but they are not as prominent.  He states they are very itchy.  Benadryl does not seem to make much of a difference in the symptoms.  He does travel frequently.  He has been to the [facility] in [place] and hives were present there as well.  He states he has not changed anything at home.  He has tried eliminating some foods from his diet, however, the rash continues to occur.  He notes that ibuprofen and alcohol seems to worsen the rash.  He also notes that heat and exercise seem to worsen the rash.  He states he has had hives before when he has been hot and sweaty.  He states it is very intermittent and minor, however.  No bruising to the rash.  He does have some intermittent abdominal pain.    With the cough, he notes the cough is very coarse.  He will cough to the point of almost throwing up.  He has been prescribed albuterol in the past but does not have a current inhaler.  He has never been diagnosed with asthma.  He states he does cough in his sleep.  No fevers.  He has felt some wheezing as well in some shortness of breath.  He does have some sneezing.  He also some drainage.  Specific IgE testing was done by his primary care provider.  Total IgE was 138 the testing was negative.  Food testing was also  "sent which was negative.    Past medical history: Broken bones, hernia, pneumonia    Social history: He works as a ,, no pets, no recent changes at home, lives in a condo, no central air, no mold or water damage, non-smoker    Family history: Mother has metal allergy    Review of Systems performed as above and the remainder is negative.      Current Outpatient Prescriptions:      ciclopirox (PENLAC) 8 % solution, Apply over nail and surrounding skin. Apply daily over previous coat. After seven (7) days, may remove with alcohol and continue cycle., Disp: 6.6 mL, Rfl: 3     ibuprofen (ADVIL,MOTRIN) 200 MG tablet, Take 200 mg by mouth every 6 (six) hours as needed for pain., Disp: , Rfl:      albuterol (PROAIR HFA;PROVENTIL HFA;VENTOLIN HFA) 90 mcg/actuation inhaler, Inhale 2 puffs every 6 (six) hours as needed for wheezing., Disp: 1 Inhaler, Rfl: 0     diphenhydrAMINE (BENADRYL) 25 mg capsule, Take 25 mg by mouth every 6 (six) hours as needed for itching., Disp: , Rfl:      mometasone-formoterol (DULERA) 100-5 mcg/actuation HFAA inhaler, Inhale 2 puffs 2 (two) times a day., Disp: 1 Inhaler, Rfl: 1    No Known Allergies    /90  Pulse 74  Ht 6' 1.5\" (1.867 m)  Wt (!) 275 lb (124.7 kg)  BMI 35.79 kg/m2  Gen: Pleasant male not in acute distress  HEENT: Eyes no erythema of the bulbar or palpebral conjunctiva, no edema. Ears: TMs well visualized, no effusions. Nose: No congestion, mucosa normal. Mouth: Throat clear, no lip or tongue edema.   Cardiac: Regular rate and rhythm, no murmurs, rubs or gallops  Respiratory: Clear to auscultation bilaterally, no adventitious breath sounds  Lymph: No supraclavicular or cervical lymphadenopathy  Skin: No rashes or lesions  Psych: Alert and oriented times 3    FENO: 13    Spirometry was performed.  Only able to obtain to reproducible matches due to the patient coughing.  FEV1 to FVC ratio 87%.  FEV1 3.6 L or 79% predicted.  FVC 4.1 L or 72% of " predicted.    Impression report and plan:  1.  Acute urticaria    I am not sure as to the specific etiology, however, in many cases of hives, it is not allergic in origin.  Allergy testing has been negative.  I would recommend a systemic workup including TSH, CBC with differential, tryptase, celiac disease panel, vitamin D and H. Pylori.  I went over specific triggers for hives including alcohol, stress, illness.  I would like him to use twice daily loratidine.  Notify if symptoms not controlled.    2.  Cough    I am concerned about non-allergic asthma.  I would like him to start Dulera 100/5 2 puffs twice daily.  Albuterol as needed.  If cough persists, he should notify.  Okay to stop Dulera in 1 month if symptoms are improved.  If symptoms return, return for follow-up.

## 2021-06-19 NOTE — LETTER
Letter by Ranjeet Franco MD at      Author: Ranjeet Franco MD Service: -- Author Type: --    Filed:  Encounter Date: 4/18/2019 Status: (Other)         Vikas Lees  [address] [place] [address]             April 18, 2019         Dear Mr. Lees,    Below are the results from your recent visit:    Resulted Orders   Glycosylated Hemoglobin A1c   Result Value Ref Range    Hemoglobin A1c 5.4 3.5 - 6.0 %   Comprehensive Metabolic Panel   Result Value Ref Range    Sodium 140 136 - 145 mmol/L    Potassium 4.2 3.5 - 5.0 mmol/L    Chloride 104 98 - 107 mmol/L    CO2 26 22 - 31 mmol/L    Anion Gap, Calculation 10 5 - 18 mmol/L    Glucose 89 70 - 125 mg/dL    BUN 11 8 - 22 mg/dL    Creatinine 0.90 0.70 - 1.30 mg/dL    GFR MDRD Af Amer >60 >60 mL/min/1.73m2    GFR MDRD Non Af Amer >60 >60 mL/min/1.73m2    Bilirubin, Total 0.9 0.0 - 1.0 mg/dL    Calcium 9.6 8.5 - 10.5 mg/dL    Protein, Total 7.2 6.0 - 8.0 g/dL    Albumin 4.0 3.5 - 5.0 g/dL    Alkaline Phosphatase 62 45 - 120 U/L    AST 18 0 - 40 U/L    ALT 28 0 - 45 U/L    Narrative    Fasting Glucose reference range is 70-99 mg/dL per  American Diabetes Association (ADA) guidelines.   Thyroid Cascade   Result Value Ref Range    TSH 1.04 0.30 - 5.00 uIU/mL   Microalbumin, Random Urine   Result Value Ref Range    Microalbumin, Random Urine 1.48 0.00 - 1.99 mg/dL    Creatinine, Urine 141.4 mg/dL    Microalbumin/Creatinine Ratio Random Urine 10.5 <=19.9 mg/g    Narrative    Microalbumin, Random Urine  <2.0 mg/dL . . . . . . . . Normal  3.0-30.0 mg/dL . . . . . . Microalbuminuria  >30.0 mg/dL . . . . . .  . Clinical Proteinuria  Microalbumin/Creatinine Ratio, Random Urine  <20 mg/g . . . . .. . . . Normal   mg/g . . . . . . . Microalbuminuria  >300 mg/g . . . . . . . . Clinical Proteinuria   Urinalysis-UC if Indicated   Result Value Ref Range    Color, UA Yellow Colorless, Yellow, Straw, Light Yellow    Clarity, UA Clear Clear    Glucose,  UA Negative Negative    Bilirubin, UA Negative Negative    Ketones, UA Negative Negative    Specific Gravity, UA 1.020 1.005 - 1.030    Blood, UA Negative Negative    pH, UA 6.0 5.0 - 8.0    Protein, UA Negative Negative mg/dL    Urobilinogen, UA 0.2 E.U./dL 0.2 E.U./dL, 1.0 E.U./dL    Nitrite, UA Negative Negative    Leukocytes, UA Negative Negative    Narrative    Microscopic not indicated  UC not indicated   HM1 (CBC with Diff)   Result Value Ref Range    WBC 8.0 4.0 - 11.0 thou/uL    RBC 4.92 4.40 - 6.20 mill/uL    Hemoglobin 16.7 14.0 - 18.0 g/dL    Hematocrit 48.6 40.0 - 54.0 %    MCV 99 80 - 100 fL    MCH 34.0 27.0 - 34.0 pg    MCHC 34.4 32.0 - 36.0 g/dL    RDW 10.7 (L) 11.0 - 14.5 %    Platelets 287 140 - 440 thou/uL    MPV 6.7 (L) 7.0 - 10.0 fL    Neutrophils % 69 50 - 70 %    Lymphocytes % 20 20 - 40 %    Monocytes % 8 2 - 10 %    Eosinophils % 3 0 - 6 %    Basophils % 1 0 - 2 %    Neutrophils Absolute 5.6 2.0 - 7.7 thou/uL    Lymphocytes Absolute 1.6 0.8 - 4.4 thou/uL    Monocytes Absolute 0.6 0.0 - 0.9 thou/uL    Eosinophils Absolute 0.2 0.0 - 0.4 thou/uL    Basophils Absolute 0.1 0.0 - 0.2 thou/uL       Your recent blood test results  were all within acceptable limit.  Please do not hesitate to contact me if you have any question.  Dr Franco    Please call with questions or contact us using [employer].    Sincerely,        Electronically signed by Ranjeet Franco MD

## 2021-08-11 ENCOUNTER — OFFICE VISIT (OUTPATIENT)
Dept: FAMILY MEDICINE | Facility: CLINIC | Age: 46
End: 2021-08-11
Payer: COMMERCIAL

## 2021-08-11 VITALS
WEIGHT: 315 LBS | HEIGHT: 75 IN | HEART RATE: 80 BPM | SYSTOLIC BLOOD PRESSURE: 138 MMHG | DIASTOLIC BLOOD PRESSURE: 112 MMHG | OXYGEN SATURATION: 98 % | BODY MASS INDEX: 39.17 KG/M2

## 2021-08-11 DIAGNOSIS — S83.412D SPRAIN OF MEDIAL COLLATERAL LIGAMENT OF LEFT KNEE, SUBSEQUENT ENCOUNTER: Primary | ICD-10-CM

## 2021-08-11 DIAGNOSIS — I10 BENIGN ESSENTIAL HYPERTENSION: ICD-10-CM

## 2021-08-11 PROCEDURE — 99214 OFFICE O/P EST MOD 30 MIN: CPT | Performed by: STUDENT IN AN ORGANIZED HEALTH CARE EDUCATION/TRAINING PROGRAM

## 2021-08-11 RX ORDER — LISINOPRIL 5 MG/1
5 TABLET ORAL DAILY
Qty: 30 TABLET | Refills: 1 | Status: SHIPPED | OUTPATIENT
Start: 2021-08-11 | End: 2021-10-11

## 2021-08-11 RX ORDER — AMLODIPINE BESYLATE 10 MG/1
10 TABLET ORAL DAILY
Qty: 30 TABLET | Refills: 1 | Status: SHIPPED | OUTPATIENT
Start: 2021-08-11 | End: 2021-10-11

## 2021-08-11 ASSESSMENT — MIFFLIN-ST. JEOR: SCORE: 2397.18

## 2021-08-11 NOTE — PROGRESS NOTES
ASSESSMENT AND PLAN     Vikas was seen today for follow up.    Diagnoses and all orders for this visit:    Sprain of medial collateral ligament of left knee, subsequent encounter  Patient presents with two days of left knee pain after falling down stairs. XR negative for fracture of effusion in the ED. Patient continues to have pain with ambulation and medial and lateral stress. No signs of acute ligament or tendon tears on exam, likely medial collateral ligament sprain. Counseled patient on conservative management including RICE with further evaluation including MRI or ortho referral after initial swelling decreases and if pain is not improved with PT. Will send to PT for gentle range of motion exercises.  Will continue tylenol PRN for pain. Patient is amenable to this.  -     Physical Therapy Referral; Future    Benign essential hypertension  Patient has history of HTN but has not been on anti-hypertensives for two years. BP elevated in clinic and in the ER earlier this week. Will increase amlodipine dose and start lisinopril for renal-protective effects given recent elevated creatinine. Will follow up in 4-6 weeks for further management and medication adjustment.   -     amLODIPine (NORVASC) 10 MG tablet; Take 1 tablet (10 mg) by mouth daily  -     lisinopril (ZESTRIL) 5 MG tablet; Take 1 tablet (5 mg) by mouth daily    RTC in 4-6 weeks for follow up of knee pain and HTN or sooner if develops new or worsening symptoms.    Ree Joseph, MS4    I was present with the medical student who participated in the service and in the documentation of this note. I have verified the history and personally performed the physical exam and medical decision making, and have verified the content of the note, which accurately reflects my assessment of the patient and the plan of care.   Caroline Dalal, DO            SUBJECTIVE       Vikas MCGILL Nabeel is a 45 year old  male with a PMH significant for:     Patient Active  "Problem List   Diagnosis   (none) - all problems resolved or deleted     He presents for ER follow up after fall, HTN and elevated creatinine.     On 8/9/21 patient fell down wet wooden stairs and landed on his back. He hit his head but did not have LOC. He presented to [place] ED with left knee pain and left knee XR demonstrated no fracture or dislocation. Acute head pathology was ruled out. Patient has pain 8/10 with medial and lateral stress with walking. He states he is unable to \"bend\" his left knee. He received a knee brace from the ER but can only wear it at night because he states he is unable to bend his knee with it on. Patient denies pain at rest while sitting or lying down.  Patient denies any locking or catching of the joint.  He has been taking tylenol 1000 QID without significant relief. He also has been icing some. He is interested in a MRI to evaluate his left knee further.    Patient also has history of HTN treated previously with lisinopril 10mg and hyzaar 50-12.5 mg. Patient states he stopped taking his meds because of a \"recall\" about two years ago. His BP was found to be 151/111 and he was prescirbed amlodipine 5 mg in the ER on 8/9/21.    Patient also found to have elevated creatinine of 1.37 the ER on 8/9/21. Baseline creatinine appears around 0.9 in 2019.    PMH, Medications and Allergies were reviewed and updated as needed.        REVIEW OF SYSTEMS     Pertinent items are noted in HPI.        OBJECTIVE     Vitals:    08/11/21 0952   BP: (!) 138/112   BP Location: Left arm   Patient Position: Sitting   Cuff Size: Adult Large   Pulse: 80   SpO2: 98%   Weight: 143.2 kg (315 lb 9.6 oz)   Height: 1.905 m (6' 3\")     Body mass index is 39.45 kg/m .      Constitutional: Awake, alert, cooperative, no apparent distress, and appears stated age.  Eyes: Lids and lashes normal, sclera clear, conjunctiva normal.  ENT: Normocephalic, without obvious abnormality, atramatic,   Lungs: No increased work of " breathing, good air exchange, clear to auscultation bilaterally, no crackles or wheezing.  Cardiovascular: Regular rate and rhythm, normal S1 and S2, no S3 or S4, and no murmur noted.  Abdomen: Normal bowel sounds, soft, non-distended, non-tender, no masses palpated, no hepatosplenomegally.  Musculoskeletal: No redness or warmth of the joints. Trace bilateral lower extremity edema to the knees. Swelling and tenderness with medial stress and palpation of left knee. Negative anterior and posterior drawer tests of left knee. Normal varus and valgus strain.  Negative patellar apprehension.  Negative patellar compression.  No pain along the joint space.  Neurologic: Awake, alert, oriented to name, place and time.  Cranial nerves II-XII are grossly intact.    No results found for this or any previous visit (from the past 24 hour(s)).

## 2021-09-02 ENCOUNTER — THERAPY VISIT (OUTPATIENT)
Dept: PHYSICAL THERAPY | Facility: CLINIC | Age: 46
End: 2021-09-02
Attending: STUDENT IN AN ORGANIZED HEALTH CARE EDUCATION/TRAINING PROGRAM
Payer: COMMERCIAL

## 2021-09-02 DIAGNOSIS — S83.412D SPRAIN OF MEDIAL COLLATERAL LIGAMENT OF LEFT KNEE, SUBSEQUENT ENCOUNTER: ICD-10-CM

## 2021-09-02 PROCEDURE — 97110 THERAPEUTIC EXERCISES: CPT | Mod: GP | Performed by: PHYSICAL THERAPIST

## 2021-09-02 PROCEDURE — 97161 PT EVAL LOW COMPLEX 20 MIN: CPT | Mod: GP | Performed by: PHYSICAL THERAPIST

## 2021-09-02 ASSESSMENT — ACTIVITIES OF DAILY LIVING (ADL)
SIT WITH YOUR KNEE BENT: ACTIVITY IS VERY DIFFICULT
SWELLING: THE SYMPTOM AFFECTS MY ACTIVITY MODERATELY
WALK: ACTIVITY IS FAIRLY DIFFICULT
LIMPING: THE SYMPTOM PREVENTS ME FROM ALL DAILY ACTIVITIES
AS_A_RESULT_OF_YOUR_KNEE_INJURY,_HOW_WOULD_YOU_RATE_YOUR_CURRENT_LEVEL_OF_DAILY_ACTIVITY?: SEVERELY ABNORMAL
SQUAT: ACTIVITY IS VERY DIFFICULT
GO UP STAIRS: ACTIVITY IS FAIRLY DIFFICULT
KNEE_ACTIVITY_OF_DAILY_LIVING_SCORE: 28.57
KNEEL ON THE FRONT OF YOUR KNEE: ACTIVITY IS VERY DIFFICULT
PAIN: THE SYMPTOM AFFECTS MY ACTIVITY SEVERELY
STAND: ACTIVITY IS MINIMALLY DIFFICULT
KNEE_ACTIVITY_OF_DAILY_LIVING_SUM: 20
GO DOWN STAIRS: ACTIVITY IS FAIRLY DIFFICULT
STIFFNESS: THE SYMPTOM AFFECTS MY ACTIVITY SEVERELY
RISE FROM A CHAIR: ACTIVITY IS SOMEWHAT DIFFICULT
WEAKNESS: THE SYMPTOM PREVENTS ME FROM ALL DAILY ACTIVITIES
RAW_SCORE: 20
HOW_WOULD_YOU_RATE_THE_CURRENT_FUNCTION_OF_YOUR_KNEE_DURING_YOUR_USUAL_DAILY_ACTIVITIES_ON_A_SCALE_FROM_0_TO_100_WITH_100_BEING_YOUR_LEVEL_OF_KNEE_FUNCTION_PRIOR_TO_YOUR_INJURY_AND_0_BEING_THE_INABILITY_TO_PERFORM_ANY_OF_YOUR_USUAL_DAILY_ACTIVITIES?: 40
GIVING WAY, BUCKLING OR SHIFTING OF KNEE: THE SYMPTOM PREVENTS ME FROM ALL DAILY ACTIVITIES
HOW_WOULD_YOU_RATE_THE_OVERALL_FUNCTION_OF_YOUR_KNEE_DURING_YOUR_USUAL_DAILY_ACTIVITIES?: SEVERELY ABNORMAL

## 2021-09-02 NOTE — PROGRESS NOTES
[facility] Initial Evaluation  9/2/2021     Precautions/Restrictions/MD instructions: eval and treat    Therapist Assessment: Vikas Lees is a 45 year old male patient presenting to Physical Therapy with L knee pain. Patient demonstrates decreased quad activation on L, increased pain with L valgus stress at 0 and 30 degrees, increased TTP to L MCL, pain with double leg squat, decreased L knee extension   . Signs and symptoms are consistent with L MCL sprain. These impairments limit their ability to ambulated without pain, perform stairs without pain, squat without pain. Skilled PT services are necessary in order to reduce impairments and improve independent function.    Subjective History    Injury/Condition Details:  Presenting Complaint L knee pain   Onset Timing/Date 8/192021, (Doctor's referral 8/11/2021)   Mechanism Fall down stairs      Symptom Behavior Details    Primary Symptoms Constant symptoms; worsen with activity, pain (Location: medial side, Quality: Aching/Throbbing and Tender), stiffness, weakness      Denies locking, catching, giving way, or instability. Denies numbness, tingling, changes in sensation. Denies having related symptoms spreading to the L calf.    Worst Pain 8/10 (with bending)   Symptom Provocators Bending, stairs, walking, pivoting squatting    Best Pain 0/10    Symptom Relievers Tylenol, elevated, icing, keep it moving    Time of day dependent? Worse in evening after activity   Recent symptom change? symptoms improving     Prior Testing/Intervention for current condition:  Prior Tests  x-ray   Prior Treatment Brace     Lifestyle & General Medical History:  Employment     Usual physical activities  (within past year) Tennis, soccer, rugby, weight lifting    Orthopaedic History  L foot fracture, neck and back fracture    Medication  High blood pressure    Notable medical history Overweight    Patient goals Get back to normal,  pain free    Patient Reported Health good   Red Flags: (Bold when present) - reviewed the following and denies  Malaise, unexplained weight loss, night pain, fever           PHYSICAL THERAPY KNEE EXAM     Dynamic Movement Screen:  DL Squat: Anterior knee translation, Knee valgus, Hip internal rotation and Improper use of glutes/hips, + pain   1 leg stance:   Right: proprioceptive challenge  Left: proprioceptive challenge    1 leg squat:   Right: proprioceptive challenge, excessive contralateral pelvic drop, excessive femoral IR/ADD and excessive anterior knee excursion  Left: proprioceptive challenge and decreased depth due to pain     Gait: HIP/TRUNK, Trendelenburg bilaterally, Decreased hip extension, KNEE and Knee flexion at initial contact increased on L     (*Indicates patient s pain)  Knee PROM L  R   Hyperextension 2 5   Extension 0 0   Flexion 130 130   (*Indicates patient s pain)    Hip PROM:     L R   IR 15 15   ER 40 40   Martha's - -   Reno - -       Special tests:   L R   Anterior Drawer - -   Posterior Drawer - -   Lachman's - -   Valgus 0 degrees + -   Valgus 30 degrees + -   Varus 0 degrees - -   Varus 30 degrees - -   Alfred's - -   Appley's - -   Lateral Compression - -   Patellar Compression - -   (*Indicates patient's pain)    Resisted Tests Strength (MMT)    L R   Knee Ext 4-/5 4/5   Knee Flex 4/5 4/5   Hip ABD 4-/5 4-/5   Hip Flex 4/5 4/5   Hip EXT 4-/5 4-/5   (*Indicates patient s pain)    Patellar tracking: normal    Other:  Joint Line Swelling: n/a   Knee Joint Effusion (Stroke Test Assessment):  Right: 0  Left: 0  ASSESSMENT/PLAN:  The patient is a 45 year old male with chief complaint of L knee pain.    The patient has the following significant findings with corresponding treatment plan.  Diagnosis 1:  Signs and symptoms consistent with L MCL sprain     Pain -  manual therapy, splint/taping/bracing/orthotics, self management, education and home program  Decreased ROM/flexibility - manual  therapy, therapeutic exercise and home program  Decreased joint mobility - manual therapy, therapeutic exercise and home program  Decreased strength - therapeutic exercise, therapeutic activities and home program  Impaired balance - neuro re-education, therapeutic activities and home program  Decreased proprioception - neuro re-education and therapeutic activities  Impaired gait - gait training and assistive devices  Impaired muscle performance - neuro re-education and home program  Decreased function - therapeutic activities and home program  Impaired posture - neuro re-education, therapeutic activities and home program  Instability -  Therapeutic Activity, Therapeutic Exercise, Neuromuscular Re-education, Splinting/Taping/Bracing/Orthotic, home program      Therapy Evaluation Codes:   1) History comprised of:   Personal factors that impact the plan of care:      None.    Comorbidity factors that impact the plan of care are:      High blood pressure and Overweight.     Medications impacting care: High blood pressure.  2) Examination of Body Systems comprised of:   Body structures and functions that impact the plan of care:      Knee.   Activity limitations that impact the plan of care are:      Lifting, Running, Sports, Squatting/kneeling, Stairs and Walking.  3) Clinical presentation characteristics are:   Stable/Uncomplicated.  4) Decision-Making    Low complexity using standardized patient assessment instrument and/or measureable assessment of functional outcome.  Cumulative Therapy Evaluation is: Low complexity.    Previous and current functional limitations:  (See Goal Flow Sheet for this information)    Short term and Long term goals: (See Goal Flow Sheet for this information)     Communication ability:  Patient appears to be able to clearly communicate and understand verbal and written communication and follow directions correctly.  Treatment Explanation - The following has been discussed with the patient:    RX ordered/plan of care  Anticipated outcomes  Possible risks and side effects  This patient would benefit from PT intervention to resume normal activities.   Rehab potential is good.    Frequency:  1 X week, once daily  Duration:  for 8 visits  Discharge Plan: Achieve all LTGs, be independent in home treatment program, and reach maximal therapeutic benefit.    Please refer to the daily flowsheet for treatment today, total treatment time and time spent performing 1:1 timed codes.

## 2021-09-23 ENCOUNTER — THERAPY VISIT (OUTPATIENT)
Dept: PHYSICAL THERAPY | Facility: CLINIC | Age: 46
End: 2021-09-23
Payer: COMMERCIAL

## 2021-09-23 DIAGNOSIS — S83.412D SPRAIN OF MEDIAL COLLATERAL LIGAMENT OF LEFT KNEE, SUBSEQUENT ENCOUNTER: ICD-10-CM

## 2021-09-23 PROCEDURE — 97112 NEUROMUSCULAR REEDUCATION: CPT | Mod: GP | Performed by: PHYSICAL THERAPIST

## 2021-09-23 PROCEDURE — 97140 MANUAL THERAPY 1/> REGIONS: CPT | Mod: GP | Performed by: PHYSICAL THERAPIST

## 2021-09-23 PROCEDURE — 97110 THERAPEUTIC EXERCISES: CPT | Mod: GP | Performed by: PHYSICAL THERAPIST

## 2021-10-05 ENCOUNTER — TELEPHONE (OUTPATIENT)
Dept: FAMILY MEDICINE | Facility: CLINIC | Age: 46
End: 2021-10-05

## 2021-10-05 NOTE — TELEPHONE ENCOUNTER
Reason for Call:  Same Day Appointment, Requested Provider:  Dr Dalal    PCP: Ranjeet Franco    Reason for visit: Follow up left keen sprain     Duration of symptoms:     Have you been treated for this in the past? Yes    Additional comments: Scheduled with Catina Mares for Friday 10/15 but would like to see Dr Dalal.    Can we leave a detailed message on this number? YES    Phone number patient can be reached at: Home number on file [phone] (home)    Best Time:     Call taken on 10/5/2021 at 12:26 PM by Rody Calderon

## 2021-10-20 ENCOUNTER — THERAPY VISIT (OUTPATIENT)
Dept: PHYSICAL THERAPY | Facility: CLINIC | Age: 46
End: 2021-10-20
Payer: COMMERCIAL

## 2021-10-20 DIAGNOSIS — S83.412D SPRAIN OF MEDIAL COLLATERAL LIGAMENT OF LEFT KNEE, SUBSEQUENT ENCOUNTER: ICD-10-CM

## 2021-10-20 PROCEDURE — 97110 THERAPEUTIC EXERCISES: CPT | Mod: GP | Performed by: PHYSICAL THERAPIST

## 2021-10-20 PROCEDURE — 97112 NEUROMUSCULAR REEDUCATION: CPT | Mod: GP | Performed by: PHYSICAL THERAPIST

## 2021-10-29 ENCOUNTER — OFFICE VISIT (OUTPATIENT)
Dept: FAMILY MEDICINE | Facility: CLINIC | Age: 46
End: 2021-10-29
Payer: COMMERCIAL

## 2021-10-29 VITALS
BODY MASS INDEX: 38.55 KG/M2 | OXYGEN SATURATION: 97 % | SYSTOLIC BLOOD PRESSURE: 144 MMHG | WEIGHT: 308.4 LBS | HEART RATE: 105 BPM | DIASTOLIC BLOOD PRESSURE: 90 MMHG

## 2021-10-29 DIAGNOSIS — I10 BENIGN ESSENTIAL HYPERTENSION: ICD-10-CM

## 2021-10-29 DIAGNOSIS — R01.1 HEART MURMUR: Primary | ICD-10-CM

## 2021-10-29 PROCEDURE — 99214 OFFICE O/P EST MOD 30 MIN: CPT | Performed by: FAMILY MEDICINE

## 2021-10-29 RX ORDER — AMLODIPINE BESYLATE 10 MG/1
10 TABLET ORAL DAILY
Qty: 90 TABLET | Refills: 1 | Status: SHIPPED | OUTPATIENT
Start: 2021-10-29 | End: 2022-05-10

## 2021-10-29 RX ORDER — LISINOPRIL 10 MG/1
10 TABLET ORAL DAILY
Qty: 30 TABLET | Refills: 0 | Status: SHIPPED | OUTPATIENT
Start: 2021-10-29 | End: 2021-12-03

## 2021-10-29 NOTE — PROGRESS NOTES
Assessment and Plan    Heart murmur  This is apparently new - not documented on previous exams  - Echocardiogram Complete    Benign essential hypertension  Not ideally controlled on lisinopril 5 mg. Trial of increase to  - lisinopril (ZESTRIL) 10 MG tablet  Dispense: 30 tablet; Refill: 0 - needs labs before further refills  - amLODIPine (NORVASC) 10 MG tablet  Dispense: 90 tablet; Refill: 1    Monitor with  - Basic metabolic panel  (Ca, Cl, CO2, Creat, Gluc, K, Na, BUN) - come back for this in about a week after starting higher dose       Return in about 2 weeks (around 11/12/2021), or blood pressure check and labs.    Catina Mares MD     -------------------------------------------    Chief concern:     8/9/21 : Vikas feel down some stairs - fell down some wet steps outside in condo and whacked head, injured knee - Vikas was seen  given splint  And ibuprofen    He Saw Dr. Dalal here two days later:    Sprain of medial collateral ligament of left knee, subsequent encounter  Patient presents with two days of left knee pain after falling down stairs. XR negative for fracture of effusion in the ED. Patient continues to have pain with ambulation and medial and lateral stress. No signs of acute ligament or tendon tears on exam, likely medial collateral ligament sprain. Counseled patient on conservative management including RICE with further evaluation including MRI or ortho referral after initial swelling decreases and if pain is not improved with PT. Will send to PT for gentle range of motion exercises.  Will continue tylenol PRN for pain. Patient is amenable to this.  -     Physical Therapy Referral; Future     Benign essential hypertension  Patient has history of HTN but has not been on anti-hypertensives for two years. BP elevated in clinic and in the ER earlier this week. Will increase amlodipine dose and start lisinopril for renal-protective effects given recent elevated creatinine. Will follow up in 4-6 weeks  for further management and medication adjustment.   -     amLODIPine (NORVASC) 10 MG tablet; Take 1 tablet (10 mg) by mouth daily  -     lisinopril (ZESTRIL) 5 MG tablet; Take 1 tablet (5 mg) by mouth daily    TODAY he contrasts where he was -  limping so much that his lower back was in pain -  - with where he is now. He is not 100% yet, but is no longer taking pain medication and is doing PT and gentle exercises in the gym. He has bee avoiding tennis because of the risk of a twisting injury.  He is not working out as much, when means he has also gained some weight    Wt Readings from Last 5 Encounters:   10/29/21 139.9 kg (308 lb 6.4 oz)   10/15/21 128.5 kg (283 lb 3 oz)   08/11/21 143.2 kg (315 lb 9.6 oz)   02/17/20 131.1 kg (289 lb)   05/20/19 134.6 kg (296 lb 12 oz)     He is walking and each week he is walking further  - up to 20-30 blocks. Nothing in particular makes it hurt. Lying on the bed with legs hanging he can feel a bit of stress on his knee    Hypertension - he has check his blood pressure - last at pharmacy 3 weeks ago - BP systolic in the 140s    upper respiratory illness : IN the last couple of weeks a bit of a chesty cough with season changes. He has no dyspnea on exertion  - or only with strenuous activities    Will be leaving for [place] in December. Hasn't had first covid vaccine  - he is planning on scheduling this soon    Social History: He does  for [employer]          Current Outpatient Medications   Medication     amLODIPine (NORVASC) 10 MG tablet     lisinopril (ZESTRIL) 10 MG tablet     lisinopril (ZESTRIL) 5 MG tablet     No current facility-administered medications for this visit.       The history section was last reviewed by Franca Husain on Oct 29, 2021.    History   Smoking Status     Never Smoker   Smokeless Tobacco     Never Used       Social History    Substance and Sexual Activity      Alcohol use: Yes        Comment: moderate        BP (!) 144/90 (BP Location: Left  arm, Patient Position: Sitting, Cuff Size: Adult Large)   Pulse 105   Wt 139.9 kg (308 lb 6.4 oz)   SpO2 97%   BMI 38.55 kg/m    Body mass index is 38.55 kg/m .     EXAM:    General appearance - alert, well appearing, and in no distress  Mental status - normal mood, behavior, speech, dress, motor activity, and thought processes  Chest - clear to auscultation, no wheezes, rales or rhonchi, symmetric air entry  Heart - normal rate and regular rhythm, S1 and S2 normal, systolic murmur 2/6 at 2nd left intercostal space and at apex

## 2021-11-11 ENCOUNTER — VIRTUAL VISIT (OUTPATIENT)
Dept: FAMILY MEDICINE | Facility: CLINIC | Age: 46
End: 2021-11-11
Payer: COMMERCIAL

## 2021-11-11 DIAGNOSIS — I10 BENIGN ESSENTIAL HYPERTENSION: ICD-10-CM

## 2021-11-11 DIAGNOSIS — J20.9 ACUTE BRONCHITIS, UNSPECIFIED ORGANISM: Primary | ICD-10-CM

## 2021-11-11 PROCEDURE — 99213 OFFICE O/P EST LOW 20 MIN: CPT | Mod: GT | Performed by: FAMILY MEDICINE

## 2021-11-11 RX ORDER — BENZONATATE 200 MG/1
200 CAPSULE ORAL 3 TIMES DAILY PRN
Qty: 30 CAPSULE | Refills: 0 | Status: SHIPPED | OUTPATIENT
Start: 2021-11-11 | End: 2022-09-26

## 2021-11-11 NOTE — PROGRESS NOTES
"Vikas is a 45 year old who is being evaluated via a billable video visit.      How would you like to obtain your AVS? MyChart  If the video visit is dropped, the invitation should be resent by: [phone]  Will anyone else be joining your video visit? No      Video Start Time: 5:24 PM    Assessment & Plan     Acute bronchitis, unspecified organism  - benzonatate (TESSALON) 200 MG capsule  Dispense: 30 capsule; Refill: 0    Call if not getting better with REST two day and above prescription  - would consider adding [id number]     Return if symptoms worsen or fail to improve.    Catina Mares MD  [facility]    Subjective   Vikas is a 45 year old who presents for the following health issues       HPI     This is the third week at least of a cough an not going away, and it has been worse over the last week or so. He says this is not atypical of when he gets a cold and gets run down by working too much - \"working long hours at night and run myself into the ground.\"    He took today and tomorrow off. Then December 10th goes to [place]. Benzonatate has helped in the past - current symptoms are similar to  2 years ago. Has not been taking ibuprofen      Review of Systems    - No fever   - coughing to the extent that it is difficult to catch his breath while coughing   - Not feeling short of breath   - no loss of taste or smell      Objective           Vitals:  No vitals were obtained today due to virtual visit.    Physical Exam   General appearance - alert, well appearing, and in no distress  Mental status - normal mood, behavior, speech,dress, motor activity, and thought processes  Chest - breathing well and without labor, but has productive sounding cough  Neurological - alert, oriented, normal speech, no focal findings or movement disorder noted        Video-Visit Details    Type of service:  Video Visit    Video End Time:5:34 PM    Originating Location (pt. Location): " Home    Distant Location (provider location):  [facility]     Platform used for Video Visit: [employer]

## 2021-11-12 ENCOUNTER — ALLIED HEALTH/NURSE VISIT (OUTPATIENT)
Dept: FAMILY MEDICINE | Facility: CLINIC | Age: 46
End: 2021-11-12
Payer: COMMERCIAL

## 2021-11-12 ENCOUNTER — LAB (OUTPATIENT)
Dept: LAB | Facility: CLINIC | Age: 46
End: 2021-11-12

## 2021-11-12 VITALS — OXYGEN SATURATION: 95 % | HEART RATE: 84 BPM | DIASTOLIC BLOOD PRESSURE: 71 MMHG | SYSTOLIC BLOOD PRESSURE: 156 MMHG

## 2021-11-12 DIAGNOSIS — I10 BENIGN ESSENTIAL HYPERTENSION: ICD-10-CM

## 2021-11-12 DIAGNOSIS — Z01.30 BLOOD PRESSURE CHECK: Primary | ICD-10-CM

## 2021-11-12 LAB
ANION GAP SERPL CALCULATED.3IONS-SCNC: 10 MMOL/L (ref 5–18)
BUN SERPL-MCNC: 14 MG/DL (ref 8–22)
CALCIUM SERPL-MCNC: 9.5 MG/DL (ref 8.5–10.5)
CHLORIDE BLD-SCNC: 105 MMOL/L (ref 98–107)
CO2 SERPL-SCNC: 26 MMOL/L (ref 22–31)
CREAT SERPL-MCNC: 1.12 MG/DL (ref 0.7–1.3)
GFR SERPL CREATININE-BSD FRML MDRD: 78 ML/MIN/1.73M2
GLUCOSE BLD-MCNC: 116 MG/DL (ref 70–125)
POTASSIUM BLD-SCNC: 4.2 MMOL/L (ref 3.5–5)
SODIUM SERPL-SCNC: 141 MMOL/L (ref 136–145)

## 2021-11-12 PROCEDURE — 99207 PR NO BILLABLE SERVICE THIS VISIT: CPT

## 2021-11-12 PROCEDURE — 80048 BASIC METABOLIC PNL TOTAL CA: CPT

## 2021-11-12 PROCEDURE — 36415 COLL VENOUS BLD VENIPUNCTURE: CPT

## 2021-11-12 RX ORDER — AMLODIPINE BESYLATE 10 MG/1
TABLET ORAL
Qty: 30 TABLET | OUTPATIENT
Start: 2021-11-12

## 2021-11-12 NOTE — TELEPHONE ENCOUNTER
"Refill request too soon.          Requested Prescriptions   Pending Prescriptions Disp Refills     amLODIPine (NORVASC) 10 MG tablet [Pharmacy Med Name: AMLODIPINE BESYLATE 10MG TABLETS] 30 tablet      Sig: TAKE 1 TABLET(10 MG) BY MOUTH DAILY       Calcium Channel Blockers Protocol  Failed - 11/11/2021  3:13 AM        Failed - Blood pressure under 140/90 in past 12 months     BP Readings from Last 3 Encounters:   10/29/21 (!) 144/90   10/15/21 (!) 142/94   08/11/21 (!) 138/112                 Failed - Normal serum creatinine on file in past 12 months     Recent Labs   Lab Test 04/17/19  1134   CR 0.90       Ok to refill medication if creatinine is low          Passed - Recent (12 mo) or future (30 days) visit within the authorizing provider's specialty     Patient has had an office visit with the authorizing provider or a provider within the authorizing providers department within the previous 12 mos or has a future within next 30 days. See \"Patient Info\" tab in inbasket, or \"Choose Columns\" in Meds & Orders section of the refill encounter.              Passed - Medication is active on med list        Passed - Patient is age 18 or older             Camryn Emerson RN 11/12/21 2:05 AM  "

## 2021-11-18 ENCOUNTER — HOSPITAL ENCOUNTER (OUTPATIENT)
Dept: CARDIOLOGY | Facility: CLINIC | Age: 46
Setting detail: OBSERVATION
Discharge: HOME | End: 2021-11-18
Attending: FAMILY MEDICINE | Admitting: FAMILY MEDICINE
Payer: COMMERCIAL

## 2021-11-18 DIAGNOSIS — R01.1 HEART MURMUR: ICD-10-CM

## 2021-11-18 LAB — BI-PLANE LVEF ECHO: NORMAL

## 2021-11-18 PROCEDURE — 999N000208 ECHOCARDIOGRAM COMPLETE

## 2021-11-18 PROCEDURE — 93306 TTE W/DOPPLER COMPLETE: CPT | Mod: 26 | Performed by: GENERAL ACUTE CARE HOSPITAL

## 2021-11-18 PROCEDURE — 255N000002 HC RX 255 OP 636: Performed by: FAMILY MEDICINE

## 2021-11-18 RX ADMIN — PERFLUTREN 2 ML: 6.52 INJECTION, SUSPENSION INTRAVENOUS at 15:30

## 2021-12-03 DIAGNOSIS — I10 BENIGN ESSENTIAL HYPERTENSION: ICD-10-CM

## 2021-12-03 RX ORDER — LISINOPRIL 10 MG/1
10 TABLET ORAL DAILY
Qty: 90 TABLET | Refills: 1 | Status: SHIPPED | OUTPATIENT
Start: 2021-12-03 | End: 2022-05-13

## 2021-12-03 NOTE — TELEPHONE ENCOUNTER
Reason for Call:  Medication or medication refill:    Do you use a [facility]?  Name of the pharmacy and phone number for the current request:  [facility] -updated pharm    Name of the medication requested:   lisinopril (ZESTRIL) 10 MG tablet 30 tablet 0 10/29/2021  --   Sig - Route: Take 1 tablet (10 mg) by mouth daily - Oral           Other request: pt is needing refill of this med and pharm said to call us.  He is traveling for 3 weeks to the  on 12/10.    Can we leave a detailed message on this number? YES    Phone number patient can be reached at: Cell number on file:    Telephone Information:   Mobile [phone]       Best Time: any    Call taken on 12/3/2021 at 12:15 PM by Pam J. Behr

## 2021-12-03 NOTE — TELEPHONE ENCOUNTER
Pending Prescriptions:                       Disp   Refills    lisinopril (ZESTRIL) 10 MG tablet         90 tab*3            Sig: Take 1 tablet (10 mg) by mouth daily    Patient did have Basic Metabolic Profile done 11/12/21 with normal result.

## 2021-12-17 ENCOUNTER — TELEPHONE (OUTPATIENT)
Dept: FAMILY MEDICINE | Facility: CLINIC | Age: 46
End: 2021-12-17

## 2021-12-17 NOTE — TELEPHONE ENCOUNTER
Reason for call:  Other   Patient called regarding (reason for call): call back  Additional comments: Patient would like Results by phone [phone] if patient is unable to answer Patient is stating that it is okay to leave results by VM     Phone number to reach patient:  Cell number on file:    Telephone Information:   Mobile [phone]       Best Time:  Anytime    Can we leave a detailed message on this number?  YES    Travel screening: Not Applicable

## 2021-12-17 NOTE — CONFIDENTIAL NOTE
Called and left a message re echocardiogram results.. Would like to refer to cardiology for discussion, though I am not very worried about the findings, it is just something that will need monitoring

## 2022-01-13 ENCOUNTER — VIRTUAL VISIT (OUTPATIENT)
Dept: FAMILY MEDICINE | Facility: CLINIC | Age: 47
End: 2022-01-13
Payer: COMMERCIAL

## 2022-01-13 DIAGNOSIS — U07.1 INFECTION DUE TO 2019 NOVEL CORONAVIRUS: Primary | ICD-10-CM

## 2022-01-13 DIAGNOSIS — R05.9 COUGH: ICD-10-CM

## 2022-01-13 PROCEDURE — 99213 OFFICE O/P EST LOW 20 MIN: CPT | Mod: GT | Performed by: FAMILY MEDICINE

## 2022-01-13 RX ORDER — CODEINE PHOSPHATE AND GUAIFENESIN 10; 100 MG/5ML; MG/5ML
1-2 SOLUTION ORAL AT BEDTIME
Qty: 118 ML | Refills: 0 | Status: SHIPPED | OUTPATIENT
Start: 2022-01-13 | End: 2022-09-26

## 2022-01-13 RX ORDER — AZITHROMYCIN 250 MG/1
TABLET, FILM COATED ORAL
Qty: 6 TABLET | Refills: 0 | Status: SHIPPED | OUTPATIENT
Start: 2022-01-13 | End: 2022-01-18

## 2022-01-13 NOTE — PROGRESS NOTES
Vikas is a 46 year old who is being evaluated via a billable video visit.      How would you like to obtain your AVS? Mail a copy  If the video visit is dropped, the invitation should be resent by: Text to cell phone: [phone]  Will anyone else be joining your video visit? No      Video Start Time: 5:48 PM    Assessment & Plan     Infection due to 2019 novel coronavirus - resolved except for   Cough - not responding to robitussin DM, Tessalon, cough drops.  History of allergies and possibly mils asthma, though he has only had asthma once before  Treat with   - mometasone-formoterol (DULERA) 100-5 MCG/ACT inhaler  Dispense: 8.8 g; Refill: 0  - azithromycin (ZITHROMAX) 250 MG tablet  Dispense: 6 tablet; Refill: 0  - guaiFENesin-codeine (ROBITUSSIN AC) 100-10 MG/5ML solution  Dispense: 118 mL; Refill: 0        Return in about 2 weeks (around 1/27/2022) for Follow up.    Catina Mares MD  [facility]   Vikas is a 46 year old who presents for the following health issues     HPI           Vikas tested positive for when he got back from [place] on  1/2/22  - his main symptoms were short term fever and fatigue.  He has a worsening of his chronic cough. Today he tested negative with home test-    Cough started before he went to [place] - 3 months ago.  He saw me for a virtual visit on 11/11/21 for a cough, and I diagnosed viral bronchitis and gave him a prescription for benzonatate - this latter helped symptoms at the time, but his cough persisted and has not become worse. He has paroxysms of coughing to hard he now thinks he has a hernia. His cough was initially in his throat and felt dry; now it feel deeper and productive.  When he is not coughing, he can breath OK    He is using the following medications which HAVE NOT been helpful   - robitussin DM   - tessalon    He has not tried ibuprofen.  He has not tried cough drops with benzocaine (like Cepacol)    He has  had allergic reactions - never wheezing - but was told he had a very low grade asthma and at one point was given an inhaler.     Objective           Vitals:  No vitals were obtained today due to virtual visit.    Physical Exam   General appearance - alert, well appearing, and in no distress. Sounds nasal  Mental status - normal mood, behavior, speech,dress, motor activity, and thought processes  Chest - breathing well and without labor. Frequent coughing  Neurological - alert, oriented, normal speech, no focal findings or movement disorder noted      Video-Visit Details    Type of service:  Video Visit    Video End Time:6:05 PM    Originating Location (pt. Location): Home    Distant Location (provider location):  [facility]     Platform used for Video Visit: [employer]

## 2022-01-19 ENCOUNTER — TELEPHONE (OUTPATIENT)
Dept: FAMILY MEDICINE | Facility: CLINIC | Age: 47
End: 2022-01-19
Payer: COMMERCIAL

## 2022-01-19 DIAGNOSIS — U07.1 INFECTION DUE TO 2019 NOVEL CORONAVIRUS: Primary | ICD-10-CM

## 2022-01-19 NOTE — TELEPHONE ENCOUNTER
I do not know what would be covered - there are many choices. He can call insurance or pharmacy might be able to advise him

## 2022-01-19 NOTE — TELEPHONE ENCOUNTER
I scheduled a 2 week f/u for pt but insurance does not cover Dulera needs a substitute or prior auth, goes to [facility] on [address] Please advise

## 2022-01-22 NOTE — TELEPHONE ENCOUNTER
PA Initiation    Medication: mometasone-formoterol (DULERA) 100-5 MCG/ACT inhaler   Insurance Company: [employer] ([facility]) - Phone [phone] Fax [phone]  Pharmacy Filling the Rx: [id number] - [place], [place] - [address] [place] [address] & [facility]  Filling Pharmacy Phone: [phone]  Filling Pharmacy Fax: [phone]  Start Date: 1/22/2022

## 2022-01-25 NOTE — TELEPHONE ENCOUNTER
PRIOR AUTHORIZATION DENIED    Medication: mometasone-formoterol (DULERA) 100-5 MCG/ACT inhaler--DENIED    Denial Date: 1/24/2022    Denial Rational: Patient needs to try and fail all preferred medications:       Appeal Information:

## 2022-01-26 RX ORDER — FLUTICASONE PROPIONATE AND SALMETEROL XINAFOATE 115; 21 UG/1; UG/1
2 AEROSOL, METERED RESPIRATORY (INHALATION) 2 TIMES DAILY
Qty: 8 G | Refills: 0 | Status: SHIPPED | OUTPATIENT
Start: 2022-01-26 | End: 2022-03-03

## 2022-01-26 NOTE — TELEPHONE ENCOUNTER
Please let him know substitute inhaler was sent  - sorry it took so long - took a while to get information on denial of previous and what they WOULD cover

## 2022-01-26 NOTE — TELEPHONE ENCOUNTER
Left message for Vikas  Message to relay:              Please let him know substitute inhaler was sent  - sorry it took so long - took a while to get information on denial of previous and what they WOULD cover

## 2022-02-03 ENCOUNTER — OFFICE VISIT (OUTPATIENT)
Dept: FAMILY MEDICINE | Facility: CLINIC | Age: 47
End: 2022-02-03
Payer: COMMERCIAL

## 2022-02-03 ENCOUNTER — ANCILLARY PROCEDURE (OUTPATIENT)
Dept: GENERAL RADIOLOGY | Facility: CLINIC | Age: 47
End: 2022-02-03
Attending: FAMILY MEDICINE
Payer: COMMERCIAL

## 2022-02-03 VITALS
WEIGHT: 306 LBS | SYSTOLIC BLOOD PRESSURE: 136 MMHG | OXYGEN SATURATION: 100 % | HEART RATE: 98 BPM | DIASTOLIC BLOOD PRESSURE: 87 MMHG | BODY MASS INDEX: 38.25 KG/M2

## 2022-02-03 DIAGNOSIS — R05.3 CHRONIC COUGH: Primary | ICD-10-CM

## 2022-02-03 DIAGNOSIS — R01.1 HEART MURMUR: ICD-10-CM

## 2022-02-03 DIAGNOSIS — I35.0 AORTIC VALVE STENOSIS, ETIOLOGY OF CARDIAC VALVE DISEASE UNSPECIFIED: ICD-10-CM

## 2022-02-03 DIAGNOSIS — R05.3 CHRONIC COUGH: ICD-10-CM

## 2022-02-03 PROCEDURE — 99213 OFFICE O/P EST LOW 20 MIN: CPT | Performed by: FAMILY MEDICINE

## 2022-02-03 PROCEDURE — 71046 X-RAY EXAM CHEST 2 VIEWS: CPT | Mod: TC | Performed by: RADIOLOGY

## 2022-02-03 NOTE — PATIENT INSTRUCTIONS
Cough could be:   - extended covid symptoms   - cough variant asthma   - reflux   - lisinopril    - (vocal chord dysfuntion)       Start the advair - if that doesn't help after about 5 days. Purchase over-the-counter prevacid or prilosec (generic) - take in 15-30 minutes before morning meal.    If this does not improve cough, let me know andwe will stop lisinopril and try something else

## 2022-02-03 NOTE — PROGRESS NOTES
"Assessment and Plan    Chronic cough  - XR Chest 2 Views - normal:  \"    INDICATION:  Chronic cough  COMPARISON: 11/08/2013.                                                                      IMPRESSION: The cardiomediastinal silhouette and pulmonary vascularity are normal. Shallow inspiration. No focal consolidation, pneumothorax nor pleural effusion.\"    Heart murmur/ Aortic valve stenosis, etiology of cardiac valve disease unspecified  - Adult Cardiology Eval Referral   - discussed given that echocardiogram result/18/21 included: \"aortic valve is not well visualized but is possibly bicuspid,\" it is possible he will need a transesophageal echocardiogram         Patient Instructions   Cough could be:   - extended covid symptoms   - cough variant asthma   - reflux   - lisinopril    - (vocal chord dysfuntion)       Start the advair - if that doesn't help after about 5 days. Purchase over-the-counter prevacid or prilosec (generic) - take in 15-30 minutes before morning meal.    If this does not improve cough, let me know andwe will stop lisinopril and try something else       Return for pending trial of instructions above.    Catina Mares MD     -------------------------------------------    Chief concern:   Virtual visit with me 1/13/21:       Infection due to 2019 novel coronavirus (started first week of January) - resolved except for   Cough - not responding to robitussin DM, Tessalon, cough drops.  History of allergies and possibly mils asthma, though he has only had asthma once before  Treat with   - mometasone-formoterol (DULERA) 100-5 MCG/ACT inhaler  Dispense: 8.8 g; Refill: 0  - azithromycin (ZITHROMAX) 250 MG tablet  Dispense: 6 tablet; Refill: 0  - guaiFENesin-codeine (ROBITUSSIN AC) 100-10 MG/5ML solution  Dispense: 118 mL; Refill: 0    TODAY - due to difficulties getting coverage for Dulera, then substitution of Advair, Vikas just started using his Advair inhaler today. He has already finished the " zpac.  He continue to cough - paroxysms of cough twice a day - worse when he is lying down. This had lessened when he was taking azithromycin. Feels like he has been doing 200 sit ups daily from all the coughing. .He is coughing up phlegm. Wants to get into the gym and work out again, but can't with this cough. Just recently started to feel more short of breath    Getting good sleep with codeine syrup    Possible contributors   - He Has been on lisinopril since September, added to amlodipine. Previously on losartan-HYDROCHLOROTHIAZIDE   - No allergies - drippy nose - but not all the time   - Has had acid reflux - doesn't get it very often      ROS   - No chest pain   - No fever, chills or sweat   - Energy level goes up and down    Also - reviewed again echocardiogram result and need for cardiology input    Current Outpatient Medications   Medication     amLODIPine (NORVASC) 10 MG tablet     benzonatate (TESSALON) 200 MG capsule     fluticasone-salmeterol (ADVAIR-HFA) 115-21 MCG/ACT inhaler     guaiFENesin-codeine (ROBITUSSIN AC) 100-10 MG/5ML solution     lisinopril (ZESTRIL) 10 MG tablet     mometasone-formoterol (DULERA) 100-5 MCG/ACT inhaler     No current facility-administered medications for this visit.       The history section was last reviewed by Coby Villeda MA on Feb 3, 2022.    History   Smoking Status     Never Smoker   Smokeless Tobacco     Never Used       Social History    Substance and Sexual Activity      Alcohol use: Yes        Comment: moderate        /87 (BP Location: Left arm, Patient Position: Sitting, Cuff Size: Adult Large)   Pulse 98   Wt 138.8 kg (306 lb)   SpO2 100%   BMI 38.25 kg/m    Body mass index is 38.25 kg/m .     EXAM:  General appearance - alert, well appearing, and in no distress  Mental status - normal mood, behavior, speech, dress, motor activity, and thought processes  Chest - clear to auscultation, no wheezes, rales or rhonchi, symmetric air entry. Coughing  frequently  Heart - normal rate and regular rhythm, S1 and S2 normal, systolic murmur 2/6 at 2nd left intercostal space and at apex

## 2022-02-04 ENCOUNTER — TELEPHONE (OUTPATIENT)
Dept: CALL CENTER | Age: 47
End: 2022-02-04
Payer: COMMERCIAL

## 2022-02-04 ENCOUNTER — TELEPHONE (OUTPATIENT)
Dept: FAMILY MEDICINE | Facility: CLINIC | Age: 47
End: 2022-02-04
Payer: COMMERCIAL

## 2022-02-04 NOTE — TELEPHONE ENCOUNTER
----- Message from Catina Mares MD sent at 2/4/2022  7:04 AM CST -----  Please call to let him know that his chest xray was read as completley normal

## 2022-02-04 NOTE — TELEPHONE ENCOUNTER
Please call patient to schedule cardiology referral. Dx is not within the scope of [facility] scheduling guidelines. Thank you!    Ree Moreno on 2/4/2022 at 10:03 AM

## 2022-02-04 NOTE — TELEPHONE ENCOUNTER
Attempted to contact patient. Left voicemail requesting patient to contact clinic regarding results below.

## 2022-03-02 DIAGNOSIS — U07.1 INFECTION DUE TO 2019 NOVEL CORONAVIRUS: ICD-10-CM

## 2022-03-03 RX ORDER — FLUTICASONE PROPIONATE AND SALMETEROL XINAFOATE 115; 21 UG/1; UG/1
AEROSOL, METERED RESPIRATORY (INHALATION)
Qty: 12 G | Refills: 0 | Status: SHIPPED | OUTPATIENT
Start: 2022-03-03 | End: 2022-04-01

## 2022-03-04 NOTE — TELEPHONE ENCOUNTER
"Last Written Prescription Date:  1/26/2022  Last Fill Quantity: 8g,  # refills: 0   Last office visit provider:  2/3/2022     Requested Prescriptions   Pending Prescriptions Disp Refills     ADVAIR -21 MCG/ACT inhaler [Pharmacy Med Name: ADVAIR /21MCG ORAL 'S] 12 g      Sig: INHALE 2 PUFFS INTO THE LUNGS TWICE DAILY       Inhaled Steroids Protocol Passed - 3/2/2022  8:19 AM        Passed - Patient is age 12 or older        Passed - Recent (12 mo) or future (30 days) visit within the authorizing provider's specialty     Patient has had an office visit with the authorizing provider or a provider within the authorizing providers department within the previous 12 mos or has a future within next 30 days. See \"Patient Info\" tab in inbasket, or \"Choose Columns\" in Meds & Orders section of the refill encounter.              Passed - Medication is active on med list       Long-Acting Beta Agonist Inhalers Protocol  Passed - 3/2/2022  8:19 AM        Passed - Patient is age 12 or older        Passed - Recent (12 mo) or future (30 days) visit within the authorizing provider's specialty     Patient has had an office visit with the authorizing provider or a provider within the authorizing providers department within the previous 12 mos or has a future within next 30 days. See \"Patient Info\" tab in inbasket, or \"Choose Columns\" in Meds & Orders section of the refill encounter.              Passed - Medication is active on med list             Azalea Otto RN 03/03/22 11:30 PM  "

## 2022-03-16 ENCOUNTER — OFFICE VISIT (OUTPATIENT)
Dept: INTERNAL MEDICINE | Facility: CLINIC | Age: 47
End: 2022-03-16
Payer: COMMERCIAL

## 2022-03-16 VITALS
SYSTOLIC BLOOD PRESSURE: 145 MMHG | WEIGHT: 222 LBS | BODY MASS INDEX: 27.75 KG/M2 | OXYGEN SATURATION: 98 % | HEART RATE: 103 BPM | DIASTOLIC BLOOD PRESSURE: 93 MMHG

## 2022-03-16 DIAGNOSIS — H02.89 PAIN AND SWELLING OF EYELID: Primary | ICD-10-CM

## 2022-03-16 DIAGNOSIS — H02.849 PAIN AND SWELLING OF EYELID: Primary | ICD-10-CM

## 2022-03-16 PROCEDURE — 99203 OFFICE O/P NEW LOW 30 MIN: CPT | Performed by: INTERNAL MEDICINE

## 2022-03-16 NOTE — PROGRESS NOTES
"      Assessment & Plan     (H02.89,  H02.849) Pain and swelling of eyelid  (primary encounter diagnosis)  Comment: suspected upper eyelid stye  Plan: warm compresses for the next week.  Reasons for prompt return to clinic or other acute care location were discussed with patient who voiced understanding.   [id number]     BMI:   Estimated body mass index is 27.75 kg/m  as calculated from the following:    Height as of 8/11/21: 1.905 m (6' 3\").    Weight as of this encounter: 100.7 kg (222 lb).   Weight management plan: Patient was referred to their PCP to discuss a diet and exercise plan.    Return in about 3 days (around 3/19/2022), or if symptoms worsen or fail to improve.    Nish Rosenbaum MD  [facility]    Minh Bean is a 46 year old who presents for the following health issues   HPI   Pt is here for 1 day hx of left upper eyelid discomfort and swelling  No injury  Denies vision issues.  No recent URI like sx.      Objective    BP (!) 145/93 (BP Location: Left arm, Patient Position: Sitting, Cuff Size: Adult Regular)   Pulse 103   Wt 100.7 kg (222 lb)   SpO2 98%   BMI 27.75 kg/m    Body mass index is 27.75 kg/m .  Physical Exam   Gen:nad  HEENT:left upper eyelid edema, mild. Some point tenderness and erythema in the center.  Conjunctiva and vision is normal.        "

## 2022-04-13 ENCOUNTER — THERAPY VISIT (OUTPATIENT)
Dept: PHYSICAL THERAPY | Facility: CLINIC | Age: 47
End: 2022-04-13
Payer: COMMERCIAL

## 2022-04-13 DIAGNOSIS — S83.412D SPRAIN OF MEDIAL COLLATERAL LIGAMENT OF LEFT KNEE, SUBSEQUENT ENCOUNTER: Primary | ICD-10-CM

## 2022-04-13 PROCEDURE — 97110 THERAPEUTIC EXERCISES: CPT | Mod: GP | Performed by: PHYSICAL THERAPIST

## 2022-04-13 PROCEDURE — 97112 NEUROMUSCULAR REEDUCATION: CPT | Mod: GP | Performed by: PHYSICAL THERAPIST

## 2022-04-13 ASSESSMENT — ACTIVITIES OF DAILY LIVING (ADL)
GO DOWN STAIRS: ACTIVITY IS NOT DIFFICULT
PAIN: I HAVE THE SYMPTOM BUT IT DOES NOT AFFECT MY ACTIVITY
RISE FROM A CHAIR: ACTIVITY IS NOT DIFFICULT
SWELLING: I DO NOT HAVE THE SYMPTOM
AS_A_RESULT_OF_YOUR_KNEE_INJURY,_HOW_WOULD_YOU_RATE_YOUR_CURRENT_LEVEL_OF_DAILY_ACTIVITY?: NEARLY NORMAL
STIFFNESS: I HAVE THE SYMPTOM BUT IT DOES NOT AFFECT MY ACTIVITY
KNEE_ACTIVITY_OF_DAILY_LIVING_SUM: 59
HOW_WOULD_YOU_RATE_THE_OVERALL_FUNCTION_OF_YOUR_KNEE_DURING_YOUR_USUAL_DAILY_ACTIVITIES?: NEARLY NORMAL
GO UP STAIRS: ACTIVITY IS NOT DIFFICULT
KNEE_ACTIVITY_OF_DAILY_LIVING_SCORE: 84.29
RAW_SCORE: 59
KNEEL ON THE FRONT OF YOUR KNEE: ACTIVITY IS MINIMALLY DIFFICULT
WALK: ACTIVITY IS NOT DIFFICULT
LIMPING: I DO NOT HAVE THE SYMPTOM
HOW_WOULD_YOU_RATE_THE_CURRENT_FUNCTION_OF_YOUR_KNEE_DURING_YOUR_USUAL_DAILY_ACTIVITIES_ON_A_SCALE_FROM_0_TO_100_WITH_100_BEING_YOUR_LEVEL_OF_KNEE_FUNCTION_PRIOR_TO_YOUR_INJURY_AND_0_BEING_THE_INABILITY_TO_PERFORM_ANY_OF_YOUR_USUAL_DAILY_ACTIVITIES?: 90
SQUAT: ACTIVITY IS SOMEWHAT DIFFICULT
GIVING WAY, BUCKLING OR SHIFTING OF KNEE: THE SYMPTOM AFFECTS MY ACTIVITY SLIGHTLY
WEAKNESS: THE SYMPTOM AFFECTS MY ACTIVITY SLIGHTLY
STAND: ACTIVITY IS NOT DIFFICULT
SIT WITH YOUR KNEE BENT: ACTIVITY IS SOMEWHAT DIFFICULT

## 2022-04-13 NOTE — PROGRESS NOTES
PROGRESS  REPORT    Progress reporting period is from 9/2/2022 to 4/13/2022.       SUBJECTIVE  Subjective: Patient returns to PT after 5 months due to travel and illness. He states he has been continuing to work on exercises, and recently started a gym routine that involves the leg press, knee extension machine, and knee flexion machine. He states he feels a slight disconnect frrom his knee with stairs and pivoting, and feels less confident with his L knee compared to R    Current Pain level: 0/10.     Previous pain level was  8/10  .   Changes in function:  Yes (See Goal flowsheet attached for changes in current functional level)  Adverse reaction to treatment or activity: None    OBJECTIVE  Changes noted in objective findings:  Yes,   Objective: L knee AROM: 130-0-3. Dynamic valgus present with lateral step down     ASSESSMENT/PLAN  Updated problem list and treatment plan: Diagnosis 1:  Signs and symptoms consistent with L MCL sprain  Pain -  manual therapy, splint/taping/bracing/orthotics, self management, education and home program  Decreased strength - therapeutic exercise, therapeutic activities and home program  Decreased function - therapeutic activities and home program  STG/LTGs have been met or progress has been made towards goals:  Yes (See Goal flow sheet completed today.)  Assessment of Progress: The patient's condition is improving.  The patient's condition has potential to improve.  The patient has met all of their long term goals.  Self Management Plans:  Patient has been instructed in a home treatment program.  Patient  has been instructed in self management of symptoms.  I have re-evaluated this patient and find that the nature, scope, duration and intensity of the therapy is appropriate for the medical condition of the patient.  Vikas continues to require the following intervention to meet STG and LTG's:  PT    Recommendations:  This patient would benefit from continued therapy.     Frequency:   1 X a month, once daily  Duration:  for 4 months        Please refer to the daily flowsheet for treatment today, total treatment time and time spent performing 1:1 timed codes.

## 2022-05-13 ENCOUNTER — OFFICE VISIT (OUTPATIENT)
Dept: FAMILY MEDICINE | Facility: CLINIC | Age: 47
End: 2022-05-13
Payer: COMMERCIAL

## 2022-05-13 VITALS
HEART RATE: 101 BPM | DIASTOLIC BLOOD PRESSURE: 80 MMHG | WEIGHT: 309.9 LBS | BODY MASS INDEX: 38.73 KG/M2 | OXYGEN SATURATION: 97 % | SYSTOLIC BLOOD PRESSURE: 139 MMHG | TEMPERATURE: 98.4 F | RESPIRATION RATE: 18 BRPM

## 2022-05-13 DIAGNOSIS — Z12.11 SCREEN FOR COLON CANCER: ICD-10-CM

## 2022-05-13 DIAGNOSIS — H00.12 CHALAZION OF RIGHT LOWER EYELID: Primary | ICD-10-CM

## 2022-05-13 DIAGNOSIS — I10 BENIGN ESSENTIAL HYPERTENSION: ICD-10-CM

## 2022-05-13 DIAGNOSIS — Z13.220 SCREENING FOR HYPERLIPIDEMIA: ICD-10-CM

## 2022-05-13 PROCEDURE — 99213 OFFICE O/P EST LOW 20 MIN: CPT | Performed by: FAMILY MEDICINE

## 2022-05-13 RX ORDER — AMLODIPINE BESYLATE 10 MG/1
10 TABLET ORAL DAILY
Qty: 90 TABLET | Refills: 3 | Status: SHIPPED | OUTPATIENT
Start: 2022-05-13 | End: 2023-05-19

## 2022-05-13 RX ORDER — LISINOPRIL 10 MG/1
10 TABLET ORAL DAILY
Qty: 90 TABLET | Refills: 1 | Status: SHIPPED | OUTPATIENT
Start: 2022-05-13 | End: 2022-06-30

## 2022-05-13 NOTE — PROGRESS NOTES
Assessment and Plan    Chalazion of right lower eyelid  Not getting better with arm compresses - discussed this may need surgical removal.  He will discuss with his eye doctor    Benign essential hypertension  Well controlled on:    - amLODIPine (NORVASC) 10 MG tablet  Dispense: 90 tablet; Refill: 3  - lisinopril (ZESTRIL) 10 MG tablet  Dispense: 90 tablet; Refill: 1  Monitor with   - Basic metabolic panel  (Ca, Cl, CO2, Creat, Gluc, K, Na, BUN)    Screen for colon cancer  - Adult Gastro Ref - Procedure Only    Screening for hyperlipidemia  - Lipid panel reflex to direct LDL Fasting       Also discussed - lower daily calories by cutting out fruit juice (since he is also eating fruit) and reducing alcohol intake    Return in about 1 year (around 5/13/2023) for Routine preventive.    Catina Mares MD     -------------------------------------------  Answers for HPI/ROS submitted by the patient on 5/13/2022  Do you check your blood pressure regularly outside of the clinic?: No  Are your blood pressures ever more than 140 on the top number (systolic) OR more than 90 on the bottom number (diastolic)? (For example, greater than 140/90): Yes  Are you following a low salt diet?: Yes  How many servings of fruits and vegetables do you eat daily?: 2-3  On average, how many sweetened beverages do you drink each day (Examples: soda, juice, sweet tea, etc.  Do NOT count diet or artificially sweetened beverages)?: 1  How many minutes a day do you exercise enough to make your heart beat faster?: 30 to 60  How many days a week do you exercise enough to make your heart beat faster?: 3 or less  How many days per week do you miss taking your medication?: 1    Chief concern:    Nabeel saw Dr. Rosenbaum 3/16/22 for a sty of his right eyelid.  Nabeel applied warm/wet compress twice a day and it seemed to go away, but has now come back 3-4 weeks ago. It is just a firm little nodule rather that redness and swelling, and it does not impinge his  eye    Hypertension  Nabeel checks when he gets a chance, says it is about the same as reading today.  He doesn't eat a lot of salt and he is starting to work out more    He knows weight loss will help.  He is tying to figure out sugar consumption- he east fruit and drinks fruit juice. Also only drinks alcohol in the evening or out wtih friends- a few drinks then.     Eats a lot of vegetarian food. Breaks out in hives because histamines - stopped tea, spinach. Takes a multivitamin and vitamin D supplement    BP Readings from Last 6 Encounters:   05/13/22 139/80   03/16/22 (!) 145/93   02/03/22 136/87   11/12/21 (!) 156/71   10/29/21 (!) 144/90   10/15/21 (!) 142/94         Cough is going away and he believes this is linked to reflux because when he avoids triggers and/or takes medication, his cough is better.  HE does not have a lot of caffeine. When he works out and is breathing hard, he does NOT have a cough, suggesting this is not exercise induced asthma      Still doing PHYSICAL THERAPY for knee    HIV test in [place] and Hep C          Pulses 98.105    Current Outpatient Medications   Medication     amLODIPine (NORVASC) 10 MG tablet     lisinopril (ZESTRIL) 10 MG tablet     ADVAIR -21 MCG/ACT inhaler     benzonatate (TESSALON) 200 MG capsule     guaiFENesin-codeine (ROBITUSSIN AC) 100-10 MG/5ML solution     mometasone-formoterol (DULERA) 100-5 MCG/ACT inhaler     No current facility-administered medications for this visit.         Health Maintenance Due   Topic Date Due     PREVENTIVE CARE VISIT  Never done     ADVANCE CARE PLANNING  Never done     Pneumococcal Vaccine: Pediatrics (0 to 5 Years) and At-Risk Patients (6 to 64 Years) (1 - PCV) Never done     COLORECTAL CANCER SCREENING  Never done     HIV SCREENING  Never done     HEPATITIS C SCREENING  Never done     LIPID  Never done     COVID-19 Vaccine (3 - Booster for Pfizer series) 04/21/2022     Health Maintenance reviewed - Has had HIV and Hep C  elsewhere. Colon caner  And lipid screening ordered today.  Declines pneumovax and covid-19 vaccines today    The history section was last reviewed by Tamie Webster on May 13, 2022.    History   Smoking Status     Never Smoker   Smokeless Tobacco     Never Used       Social History    Substance and Sexual Activity      Alcohol use: Yes        Comment: moderate        /80 (BP Location: Left arm, Patient Position: Sitting, Cuff Size: Adult Large)   Pulse 101   Temp 98.4  F (36.9  C) (Oral)   Resp 18   Wt 140.6 kg (309 lb 14.4 oz)   SpO2 97%   BMI 38.73 kg/m    Body mass index is 38.73 kg/m .     EXAM:    General appearance - alert, well appearing, and in no distress and BMI 38  Mental status - normal mood, behavior, speech, dress, motor activity, and thought processes  Eyes - right lower lid chalazion  Chest - clear to auscultation, no wheezes, rales or rhonchi, symmetric air entry  Heart - normal rate, regular rhythm, normal S1, S2, no murmurs, rubs, clicks or gallops

## 2022-06-30 DIAGNOSIS — I10 BENIGN ESSENTIAL HYPERTENSION: ICD-10-CM

## 2022-06-30 RX ORDER — LISINOPRIL 10 MG/1
TABLET ORAL
Qty: 90 TABLET | Refills: 3 | Status: SHIPPED | OUTPATIENT
Start: 2022-06-30 | End: 2023-02-27

## 2022-07-01 NOTE — TELEPHONE ENCOUNTER
"Last Written Prescription Date:  5/13/22  Last Fill Quantity: 90,  # refills: 1   Last office visit provider:  5/13/22     Requested Prescriptions   Pending Prescriptions Disp Refills     lisinopril (ZESTRIL) 10 MG tablet [Pharmacy Med Name: LISINOPRIL 10MG TABLETS] 90 tablet 1     Sig: TAKE 1 TABLET(10 MG) BY MOUTH DAILY       ACE Inhibitors (Including Combos) Protocol Passed - 6/30/2022  8:10 AM        Passed - Blood pressure under 140/90 in past 12 months     BP Readings from Last 3 Encounters:   05/13/22 139/80   03/16/22 (!) 145/93   02/03/22 136/87                 Passed - Recent (12 mo) or future (30 days) visit within the authorizing provider's specialty     Patient has had an office visit with the authorizing provider or a provider within the authorizing providers department within the previous 12 mos or has a future within next 30 days. See \"Patient Info\" tab in inbasket, or \"Choose Columns\" in Meds & Orders section of the refill encounter.              Passed - Medication is active on med list        Passed - Patient is age 18 or older        Passed - Normal serum creatinine on file in past 12 months     Recent Labs   Lab Test 11/12/21  0912   CR 1.12       Ok to refill medication if creatinine is low          Passed - Normal serum potassium on file in past 12 months     Recent Labs   Lab Test 11/12/21  0912   POTASSIUM 4.2                  Laurel Lees 06/30/22 8:22 PM  "

## 2022-09-26 ENCOUNTER — OFFICE VISIT (OUTPATIENT)
Dept: FAMILY MEDICINE | Facility: CLINIC | Age: 47
End: 2022-09-26
Payer: COMMERCIAL

## 2022-09-26 VITALS
SYSTOLIC BLOOD PRESSURE: 139 MMHG | OXYGEN SATURATION: 98 % | WEIGHT: 306 LBS | BODY MASS INDEX: 38.25 KG/M2 | DIASTOLIC BLOOD PRESSURE: 85 MMHG | TEMPERATURE: 97.7 F | RESPIRATION RATE: 18 BRPM | HEART RATE: 84 BPM

## 2022-09-26 DIAGNOSIS — M62.830 SPASM OF THORACIC BACK MUSCLE: Primary | ICD-10-CM

## 2022-09-26 PROCEDURE — 99214 OFFICE O/P EST MOD 30 MIN: CPT | Performed by: NURSE PRACTITIONER

## 2022-09-26 RX ORDER — METHYLPREDNISOLONE 4 MG
TABLET, DOSE PACK ORAL
Qty: 21 TABLET | Refills: 0 | Status: SHIPPED | OUTPATIENT
Start: 2022-09-26 | End: 2023-11-07

## 2022-09-26 RX ORDER — NAPROXEN 500 MG/1
500 TABLET ORAL 2 TIMES DAILY WITH MEALS
Qty: 60 TABLET | Refills: 0 | Status: SHIPPED | OUTPATIENT
Start: 2022-09-26 | End: 2022-09-26

## 2022-09-26 NOTE — PROGRESS NOTES
"  Assessment & Plan     Vikas was seen today for back pain.    Diagnoses and all orders for this visit:    Spasm of thoracic back muscle  -     tiZANidine (ZANAFLEX) 4 MG tablet; Take 1 tablet (4 mg) by mouth 3 times daily for 30 days  -     Physical Therapy Referral; Future  -     methylPREDNISolone (MEDROL DOSEPAK) 4 MG tablet therapy pack; Follow Package Directions      BMI:   Estimated body mass index is 38.25 kg/m  as calculated from the following:    Height as of 8/11/21: 1.905 m (6' 3\").    Weight as of this encounter: 138.8 kg (306 lb).           CRISTOBAL Pagan CNP  [facility]    Minh Bean is a 46 year old presenting for the following health issues:  Back Pain    Right lower back pain after sleeping on friends sofa tired ibuprofen; gardening today; ibuprofen 800 gm ; apply ice and heat     History of Present Illness       Back Pain:  He presents for follow up of back pain. Patient's back pain is a new problem.    Original cause of back pain: a fall  First noticed back pain: 1-4 weeks ago  Patient feels back pain: constantlyLocation of back pain:  Right lower back and right middle of back  Description of back pain: sharp  Back pain spreads: nowhere    Since patient first noticed back pain, pain is: rapidly worsening  Does back pain interfere with his job:  Yes  On a scale of 1-10 (10 being the worst), patient describes pain as:  8  What makes back pain worse: bending, coughing, certain positions and twisting  Acupuncture: not tried  Acetaminophen: not helpful  Activity or exercise: not helpful  Chiropractor:  Not tried  Cold: helpful  Heat: helpful  Massage: not tried  Muscle relaxants: not tried  NSAIDS: not helpful  Opioids: not tried  Physical Therapy: not tried  Rest: not tried  Steroid Injection: not tried  Stretching: not helpful  Surgery: not tried  TENS unit: not tried  Topical pain relievers: not helpful  Other healthcare providers patient is seeing for " back pain: None    He eats 2-3 servings of fruits and vegetables daily.He consumes 0 sweetened beverage(s) daily.He exercises with enough effort to increase his heart rate 30 to 60 minutes per day.  He exercises with enough effort to increase his heart rate 4 days per week.   He is taking medications regularly.             Review of Systems   Constitutional, HEENT, cardiovascular, pulmonary, gi and gu systems are negative, except as otherwise noted.      Objective    /85 (BP Location: Left arm, Patient Position: Sitting, Cuff Size: Adult Large)   Pulse 84   Temp 97.7  F (36.5  C) (Temporal)   Resp 18   Wt 138.8 kg (306 lb)   SpO2 98%   BMI 38.25 kg/m    Body mass index is 38.25 kg/m .       Physical Exam  Musculoskeletal:      Lumbar back: Spasms present. Negative right straight leg raise test and negative left straight leg raise test.        GENERAL: healthy, alert and no distress  NECK: no adenopathy, no asymmetry, masses, or scars and thyroid normal to palpation  RESP: lungs clear to auscultation - no rales, rhonchi or wheezes  CV: regular rate and rhythm, normal S1 S2, no S3 or S4, no murmur, click or rub, no peripheral edema and peripheral pulses strong  ABDOMEN: soft, nontender, no hepatosplenomegaly, no masses and bowel sounds normal  MS: spine exam shows ROM is daniela    No results found for any visits on 09/26/22.

## 2022-10-03 PROBLEM — S83.412D SPRAIN OF MEDIAL COLLATERAL LIGAMENT OF LEFT KNEE, SUBSEQUENT ENCOUNTER: Status: RESOLVED | Noted: 2021-09-02 | Resolved: 2022-10-03

## 2022-10-03 NOTE — PROGRESS NOTES
Discharge Note    Progress reporting period is from initial evaluation date (please see noted date below) to Apr 13, 2022.  Linked Episodes   Type: Episode: Status: Noted: Resolved: Last update: Updated by:   PHYSICAL THERAPY Left knee pain 9/2/2021 Active 9/2/2021 4/13/2022  3:46 PM Ame Romeo, PT      Comments:       Vikas failed to follow up and current status is unknown.  Please see information below for last relevant information on current status.  Patient seen for 4 visits.    SUBJECTIVE  Subjective changes noted by patient:  Patient returns to PT after 5 months due to travel and illness. He states he has been continuing to work on exercises, and recently started a gym routine that involves the leg press, knee extension machine, and knee flexion machine. He states he feels a slight disconnect frrom his knee with stairs and pivoting, and feels less confident with his L knee compared to R  .  Current pain level is 0/10.     Previous pain level was   .   Changes in function:  Yes (See Goal flowsheet attached for changes in current functional level)  Adverse reaction to treatment or activity: None    OBJECTIVE  Changes noted in objective findings: L knee AROM: 130-0-3. Dynamic valgus present with lateral step down     ASSESSMENT/PLAN  Diagnosis: L MCL sprain    Updated problem list and treatment plan:   Pain - HEP  Decreased ROM/flexibility - HEP  Decreased function - HEP  Decreased strength - HEP  STG/LTGs have been met or progress has been made towards goals:  Yes, please see goal flowsheet for most current information  Assessment of Progress: current status is unknown.    Last current status: Pt is progressing as expected   Self Management Plans:  HEP  I have re-evaluated this patient and find that the nature, scope, duration and intensity of the therapy is appropriate for the medical condition of the patient.  Vikas continues to require the following intervention to meet STG and LTG's:   HEP.    Recommendations:  Discharge with current home program.  Patient to follow up with MD as needed.    Please refer to the daily flowsheet for treatment today, total treatment time and time spent performing 1:1 timed codes.

## 2022-10-04 ENCOUNTER — HOSPITAL ENCOUNTER (EMERGENCY)
Facility: CLINIC | Age: 47
Discharge: HOME | End: 2022-10-05
Attending: EMERGENCY MEDICINE | Admitting: EMERGENCY MEDICINE
Payer: COMMERCIAL

## 2022-10-04 DIAGNOSIS — M54.50 ACUTE RIGHT-SIDED LOW BACK PAIN WITHOUT SCIATICA: ICD-10-CM

## 2022-10-04 PROCEDURE — 99285 EMERGENCY DEPT VISIT HI MDM: CPT | Mod: 25

## 2022-10-04 RX ORDER — ONDANSETRON 2 MG/ML
4 INJECTION INTRAMUSCULAR; INTRAVENOUS ONCE
Status: COMPLETED | OUTPATIENT
Start: 2022-10-05 | End: 2022-10-05

## 2022-10-04 RX ORDER — ACETAMINOPHEN 325 MG/1
975 TABLET ORAL ONCE
Status: DISCONTINUED | OUTPATIENT
Start: 2022-10-05 | End: 2022-10-05 | Stop reason: HOSPADM

## 2022-10-04 RX ORDER — KETOROLAC TROMETHAMINE 30 MG/ML
30 INJECTION, SOLUTION INTRAMUSCULAR; INTRAVENOUS ONCE
Status: COMPLETED | OUTPATIENT
Start: 2022-10-05 | End: 2022-10-05

## 2022-10-05 ENCOUNTER — APPOINTMENT (OUTPATIENT)
Dept: CT IMAGING | Facility: CLINIC | Age: 47
End: 2022-10-05
Attending: EMERGENCY MEDICINE
Payer: COMMERCIAL

## 2022-10-05 VITALS
BODY MASS INDEX: 39.27 KG/M2 | DIASTOLIC BLOOD PRESSURE: 72 MMHG | OXYGEN SATURATION: 98 % | HEART RATE: 66 BPM | TEMPERATURE: 97.9 F | SYSTOLIC BLOOD PRESSURE: 114 MMHG | RESPIRATION RATE: 16 BRPM | HEIGHT: 74 IN | WEIGHT: 306 LBS

## 2022-10-05 LAB
ALBUMIN UR-MCNC: 10 MG/DL
ANION GAP SERPL CALCULATED.3IONS-SCNC: 10 MMOL/L (ref 5–18)
APPEARANCE UR: CLEAR
BASOPHILS # BLD AUTO: 0.1 10E3/UL (ref 0–0.2)
BASOPHILS NFR BLD AUTO: 1 %
BILIRUB UR QL STRIP: NEGATIVE
BUN SERPL-MCNC: 20 MG/DL (ref 8–22)
C REACTIVE PROTEIN LHE: 1.4 MG/DL (ref 0–?)
CALCIUM SERPL-MCNC: 9.4 MG/DL (ref 8.5–10.5)
CHLORIDE BLD-SCNC: 102 MMOL/L (ref 98–107)
CO2 SERPL-SCNC: 25 MMOL/L (ref 22–31)
COLOR UR AUTO: YELLOW
CREAT SERPL-MCNC: 1.16 MG/DL (ref 0.7–1.3)
EOSINOPHIL # BLD AUTO: 0.1 10E3/UL (ref 0–0.7)
EOSINOPHIL NFR BLD AUTO: 1 %
ERYTHROCYTE [DISTWIDTH] IN BLOOD BY AUTOMATED COUNT: 12 % (ref 10–15)
GFR SERPL CREATININE-BSD FRML MDRD: 78 ML/MIN/1.73M2
GLUCOSE BLD-MCNC: 117 MG/DL (ref 70–125)
GLUCOSE UR STRIP-MCNC: NEGATIVE MG/DL
HCT VFR BLD AUTO: 49.8 % (ref 40–53)
HGB BLD-MCNC: 17.2 G/DL (ref 13.3–17.7)
HGB UR QL STRIP: NEGATIVE
HYALINE CASTS: 4 /LPF
IMM GRANULOCYTES # BLD: 0.2 10E3/UL
IMM GRANULOCYTES NFR BLD: 1 %
KETONES UR STRIP-MCNC: NEGATIVE MG/DL
LEUKOCYTE ESTERASE UR QL STRIP: NEGATIVE
LYMPHOCYTES # BLD AUTO: 1.4 10E3/UL (ref 0.8–5.3)
LYMPHOCYTES NFR BLD AUTO: 9 %
MCH RBC QN AUTO: 32.5 PG (ref 26.5–33)
MCHC RBC AUTO-ENTMCNC: 34.5 G/DL (ref 31.5–36.5)
MCV RBC AUTO: 94 FL (ref 78–100)
MONOCYTES # BLD AUTO: 1.3 10E3/UL (ref 0–1.3)
MONOCYTES NFR BLD AUTO: 9 %
MUCOUS THREADS #/AREA URNS LPF: PRESENT /LPF
NEUTROPHILS # BLD AUTO: 11.9 10E3/UL (ref 1.6–8.3)
NEUTROPHILS NFR BLD AUTO: 79 %
NITRATE UR QL: NEGATIVE
NRBC # BLD AUTO: 0 10E3/UL
NRBC BLD AUTO-RTO: 0 /100
PH UR STRIP: 5.5 [PH] (ref 5–7)
PLATELET # BLD AUTO: 315 10E3/UL (ref 150–450)
POTASSIUM BLD-SCNC: 4.1 MMOL/L (ref 3.5–5)
RBC # BLD AUTO: 5.29 10E6/UL (ref 4.4–5.9)
RBC URINE: 4 /HPF
SODIUM SERPL-SCNC: 137 MMOL/L (ref 136–145)
SP GR UR STRIP: 1.03 (ref 1–1.03)
SQUAMOUS EPITHELIAL: <1 /HPF
UROBILINOGEN UR STRIP-MCNC: <2 MG/DL
WBC # BLD AUTO: 14.9 10E3/UL (ref 4–11)
WBC URINE: 4 /HPF

## 2022-10-05 PROCEDURE — 96374 THER/PROPH/DIAG INJ IV PUSH: CPT

## 2022-10-05 PROCEDURE — 36415 COLL VENOUS BLD VENIPUNCTURE: CPT | Performed by: EMERGENCY MEDICINE

## 2022-10-05 PROCEDURE — 86140 C-REACTIVE PROTEIN: CPT | Performed by: EMERGENCY MEDICINE

## 2022-10-05 PROCEDURE — 85025 COMPLETE CBC W/AUTO DIFF WBC: CPT | Performed by: EMERGENCY MEDICINE

## 2022-10-05 PROCEDURE — 96375 TX/PRO/DX INJ NEW DRUG ADDON: CPT

## 2022-10-05 PROCEDURE — 80048 BASIC METABOLIC PNL TOTAL CA: CPT | Performed by: EMERGENCY MEDICINE

## 2022-10-05 PROCEDURE — 250N000011 HC RX IP 250 OP 636: Performed by: EMERGENCY MEDICINE

## 2022-10-05 PROCEDURE — 250N000013 HC RX MED GY IP 250 OP 250 PS 637: Performed by: EMERGENCY MEDICINE

## 2022-10-05 PROCEDURE — 74176 CT ABD & PELVIS W/O CONTRAST: CPT

## 2022-10-05 PROCEDURE — 81003 URINALYSIS AUTO W/O SCOPE: CPT | Performed by: EMERGENCY MEDICINE

## 2022-10-05 RX ORDER — POLYETHYLENE GLYCOL 3350 17 G/17G
1 POWDER, FOR SOLUTION ORAL DAILY PRN
Qty: 510 G | Refills: 0 | Status: SHIPPED | OUTPATIENT
Start: 2022-10-05 | End: 2022-11-04

## 2022-10-05 RX ORDER — DIAZEPAM 5 MG
5 TABLET ORAL EVERY 6 HOURS PRN
Qty: 10 TABLET | Refills: 0 | Status: SHIPPED | OUTPATIENT
Start: 2022-10-05 | End: 2022-10-12

## 2022-10-05 RX ORDER — SENNA AND DOCUSATE SODIUM 50; 8.6 MG/1; MG/1
1 TABLET, FILM COATED ORAL
Qty: 10 TABLET | Refills: 0 | Status: SHIPPED | OUTPATIENT
Start: 2022-10-05 | End: 2022-10-15

## 2022-10-05 RX ORDER — OXYCODONE AND ACETAMINOPHEN 5; 325 MG/1; MG/1
1 TABLET ORAL EVERY 6 HOURS PRN
Qty: 6 TABLET | Refills: 0 | Status: SHIPPED | OUTPATIENT
Start: 2022-10-05 | End: 2022-10-08

## 2022-10-05 RX ADMIN — Medication 50 MG: at 00:05

## 2022-10-05 RX ADMIN — ONDANSETRON 4 MG: 2 INJECTION INTRAMUSCULAR; INTRAVENOUS at 00:12

## 2022-10-05 RX ADMIN — KETOROLAC TROMETHAMINE 30 MG: 30 INJECTION, SOLUTION INTRAMUSCULAR; INTRAVENOUS at 00:13

## 2022-10-05 ASSESSMENT — ENCOUNTER SYMPTOMS
HEMATURIA: 0
FREQUENCY: 0
BACK PAIN: 1
DYSURIA: 0
ABDOMINAL PAIN: 0
NAUSEA: 0

## 2022-10-05 ASSESSMENT — ACTIVITIES OF DAILY LIVING (ADL): ADLS_ACUITY_SCORE: 35

## 2022-10-05 NOTE — ED TRIAGE NOTES
Pt arrives c/o of lower back pain for the last 7 days. Was seen in clinic and given medications for pain, steroids, and muscle relaxers. Tonight the pain started to get worse. He took One of his prescribed pain pills, a muscle relaxer, and 1g of tylenol PTA.     Triage Assessment     Row Name 10/04/22 9912       Triage Assessment (Adult)    Airway WDL WDL       Respiratory WDL    Respiratory WDL WDL       Skin Circulation/Temperature WDL    Skin Circulation/Temperature WDL WDL       Cardiac WDL    Cardiac WDL WDL       Peripheral/Neurovascular WDL    Peripheral Neurovascular WDL WDL       Cognitive/Neuro/Behavioral WDL    Cognitive/Neuro/Behavioral WDL WDL

## 2022-10-05 NOTE — DISCHARGE INSTRUCTIONS
Continue with the naproxen.  Take Valium for muscle spasm, this may make you sleepy, you should not operate heavy machinery while on this medication.  You can take the Percocet for breakthrough pain not controlled with naproxen and Valium.  You should try when able to wean from the Valium and try the tizanidine after a few days if the pain starts to ease up.  Follow-up with primary care doctor.  Return for any new or worsening symptoms.

## 2022-10-05 NOTE — ED PROVIDER NOTES
EMERGENCY DEPARTMENT ENCOUNTER      NAME: Vikas Lees  AGE: 46 year old male  YOB: 1975  MRN: [id number]  EVALUATION DATE & TIME: 10/4/2022 11:32 PM    PCP: Catina Mares    ED PROVIDER: Karena Thomson M.D.      Chief Complaint   Patient presents with     Back Pain         FINAL IMPRESSION:  1. Acute right-sided low back pain without sciatica        MEDICAL DECISION MAKING:    Pertinent Labs & Imaging studies reviewed. (See chart for details)  ED Course as of 10/05/22 0203   Wed Oct 05, 2022   0112 Afebrile.  Vital signs are unremarkable with exception of some mild tachypnea likely secondary to discomfort.  Patient is coming in for evaluation of right-sided low back pain.  Started about 10 days ago, was seen by his primary care doctor, diagnosed with musculoskeletal strain, given some medications and was having some improvement.  Tonight pain again started suddenly.  He describes it as colicky.  Radiates around towards his right flank.  He does have a history of kidney stones, has not had any urinary issues.  No nausea associated with it.  No groin pain.  Worse with movement, he believes it may be secondary to working out more as well as sleeping on his friend's couch.  Otherwise no trauma.    Exam for patient here with tenderness palpation across his low back with right-sided CVA tenderness with percussion.  No significant anterior abdominal discomfort.  No suprapubic discomfort.  Remainder unremarkable.    Labs done show slight elevation of white blood cell count at 15, patient did recently finish a stint of steroids for the musculoskeletal pain he was having previously however CRP is also mildly elevated at 1.4.  Awaiting urinalysis, CT scan.  To give medications for pain control.   0202 Patient's pain is improved here.  CT scan does not show any acute abnormalities.  Urinalysis unremarkable.  May be secondary to musculoskeletal strain versus minimal disc disease.  He does not have any midline  back tenderness, no neurologic symptoms of concern.  No sciatica.  Do not feel he requires any other urgent imaging at this time.  Will need to follow-up with primary care doctor going forward.  He has already been prescribed physical therapy for this but stated that he was not to be able to do that.  Will discharge with close follow-up with primary care.           Critical care: 0 minutes excluding separately billable procedures.  Includes bedside management, time reviewing test results, review of records, discussing the case with staff, documenting the medical record and time spent with family members (or surrogate decision makers) discussing specific treatment issues.          ED COURSE:  11:47 PM I met with the patient, obtained history, performed an initial exam, and discussed options and plan for diagnostics and treatment here in the ED.  1:47 AM I rechecked and updated patient on results. We discussed the plan for discharge and the patient is agreeable. Reviewed supportive cares, symptomatic treatment, outpatient follow up, and reasons to return to the Emergency Department. Patient to be discharged by ED RN.     The importance of close follow up was discussed. We reviewed warning signs and symptoms, and I instructed Mr. Lees to return to the emergency department immediately if he develops any new or worsening symptoms. I provided additional verbal discharge instructions. Mr. Lees expressed understanding and agreement with this plan of care, his questions were answered, and he was discharged in stable condition.     MEDICATIONS GIVEN IN THE EMERGENCY:  Medications   acetaminophen (TYLENOL) tablet 975 mg (0 mg Oral Hold 10/5/22 0005)   ketorolac (TORADOL) injection 30 mg (30 mg Intravenous Given 10/5/22 0013)   dimenhyDRINATE chew tab 50 mg (50 mg Oral Given 10/5/22 0005)   ondansetron (ZOFRAN) injection 4 mg (4 mg Intravenous Given 10/5/22 0012)       NEW PRESCRIPTIONS STARTED AT TODAY'S ER VISIT:  New  Prescriptions    DIAZEPAM (VALIUM) 5 MG TABLET    Take 1 tablet (5 mg) by mouth every 6 hours as needed for muscle spasms (MUSCLE SPASM)    OXYCODONE-ACETAMINOPHEN (PERCOCET) 5-325 MG TABLET    Take 1 tablet by mouth every 6 hours as needed for pain    POLYETHYLENE GLYCOL (MIRALAX) 17 GM/DOSE POWDER    Take 17 g (1 capful) by mouth daily as needed for constipation    SENNA-DOCUSATE SODIUM (SENNA S) 8.6-50 MG TABLET    Take 1 tablet by mouth nightly as needed (Narcotic pain medicine usage)          =================================================================    HPI    Patient information was obtained from: Patient    Use of : N/A         Viksa Lees is a 46 year old male with a history of HTN who presents to the ED via walk-in for the evaluation of back pain.    Patient reports lower back pain over the last week. He states he was seen in clinic at that time and was told that his pain was most likely related his muscles and was given medications for pain, steroids, and muscle relaxers. Patient states he took one of his prescribed pain medications, muscle relaxer, and finished his steroids, and states that his pain only improved and did not resolve. Two hours ago, he had sudden onset of lower back pain and notes that it radiates towards the right lower side of his back. He denies any recent strenuous activities or trauma but mentions he has been working out more recently. Patient states that the pain comes in waves and feel like spasms. Pain is worse with movement. Patient reports a history of kidney stones. Otherwise, he denies any urinary symptoms, abdominal pain, and nausea. No allergies to medications. No other complaints at this time.     REVIEW OF SYSTEMS   Review of Systems   Gastrointestinal: Negative for abdominal pain and nausea.   Genitourinary: Negative for dysuria, frequency, hematuria and urgency.   Musculoskeletal: Positive for back pain (lower).   All other systems reviewed and are  negative.    PAST MEDICAL HISTORY:  History reviewed. No pertinent past medical history.    PAST SURGICAL HISTORY:  Past Surgical History:   Procedure Laterality Date     HERNIA REPAIR  2004    inguinal     INGUINAL HERNIA REPAIR         CURRENT MEDICATIONS:      Current Facility-Administered Medications:      acetaminophen (TYLENOL) tablet 975 mg, 975 mg, Oral, Once, Karena Thomson MD    Current Outpatient Medications:      diazepam (VALIUM) 5 MG tablet, Take 1 tablet (5 mg) by mouth every 6 hours as needed for muscle spasms (MUSCLE SPASM), Disp: 10 tablet, Rfl: 0     oxyCODONE-acetaminophen (PERCOCET) 5-325 MG tablet, Take 1 tablet by mouth every 6 hours as needed for pain, Disp: 6 tablet, Rfl: 0     polyethylene glycol (MIRALAX) 17 GM/Dose powder, Take 17 g (1 capful) by mouth daily as needed for constipation, Disp: 510 g, Rfl: 0     SENNA-docusate sodium (SENNA S) 8.6-50 MG tablet, Take 1 tablet by mouth nightly as needed (Narcotic pain medicine usage), Disp: 10 tablet, Rfl: 0     amLODIPine (NORVASC) 10 MG tablet, Take 1 tablet (10 mg) by mouth daily, Disp: 90 tablet, Rfl: 3     lisinopril (ZESTRIL) 10 MG tablet, TAKE 1 TABLET(10 MG) BY MOUTH DAILY, Disp: 90 tablet, Rfl: 3     methylPREDNISolone (MEDROL DOSEPAK) 4 MG tablet therapy pack, Follow Package Directions, Disp: 21 tablet, Rfl: 0     mometasone-formoterol (DULERA) 100-5 MCG/ACT inhaler, Inhale 2 puffs into the lungs 2 times daily for 14 days, Disp: 8.8 g, Rfl: 0     tiZANidine (ZANAFLEX) 4 MG tablet, Take 1 tablet (4 mg) by mouth 3 times daily for 30 days, Disp: 90 tablet, Rfl: 0    ALLERGIES:  No Known Allergies    FAMILY HISTORY:  Family History   Problem Relation Age of Onset     Arthritis Mother      Breast Cancer Mother      Cerebrovascular Disease Mother      Diabetes Maternal Grandfather      Cerebrovascular Disease Maternal Grandfather        SOCIAL HISTORY:   Social History     Socioeconomic History     Marital status: Single     Spouse name:  "None     Number of children: None     Years of education: None     Highest education level: None   Tobacco Use     Smoking status: Never Smoker     Smokeless tobacco: Never Used   Substance and Sexual Activity     Alcohol use: Yes     Comment: moderate     Drug use: No       PHYSICAL EXAM:    Vitals: /85   Pulse 74   Temp 97.9  F (36.6  C) (Temporal)   Resp 22   Ht 1.88 m (6' 2\")   Wt 138.8 kg (306 lb)   SpO2 98%   BMI 39.29 kg/m     General:. Alert and interactive, comfortable appearing.  HENT: Oropharynx without erythema or exudates. MMM.  TMs clear bilaterally.  Eyes: Pupils mid-sized and equally reactive.   Neck: Full AROM.  No midline tenderness to palpation.  Cardiovascular: Regular rate and rhythm. Peripheral pulses 2+ bilaterally.  Chest/Pulmonary: Normal work of breathing. Lung sounds clear and equal throughout, no wheezes or crackles. No chest wall tenderness or deformities.  Abdomen: Soft, nondistended. Nontender without guarding or rebound. No significant anterior abdominal discomfort. No suprapubic discomfort.  Back/Spine: No midline tenderness. Tenderness to palpation across his low back with right-sided CVA tenderness with percussion.   Extremities: Normal ROM of all major joints. No lower extremity edema.   Skin: Warm and dry. Normal skin color.   Neuro: Speech clear. CNs grossly intact. Moves all extremities appropriately. Strength and sensation grossly intact to all extremities.   Psych: Normal affect/mood, cooperative, memory appropriate.     LAB:  All pertinent labs reviewed and interpreted.  Labs Ordered and Resulted from Time of ED Arrival to Time of ED Departure   CRP INFLAMMATION - Abnormal       Result Value    CRP 1.4 (*)    ROUTINE UA WITH MICROSCOPIC REFLEX TO CULTURE - Abnormal    Color Urine Yellow      Appearance Urine Clear      Glucose Urine Negative      Bilirubin Urine Negative      Ketones Urine Negative      Specific Gravity Urine 1.030      Blood Urine Negative      " pH Urine 5.5      Protein Albumin Urine 10  (*)     Urobilinogen Urine <2.0      Nitrite Urine Negative      Leukocyte Esterase Urine Negative      Mucus Urine Present (*)     RBC Urine 4 (*)     WBC Urine 4      Squamous Epithelials Urine <1      Hyaline Casts Urine 4 (*)    CBC WITH PLATELETS AND DIFFERENTIAL - Abnormal    WBC Count 14.9 (*)     RBC Count 5.29      Hemoglobin 17.2      Hematocrit 49.8      MCV 94      MCH 32.5      MCHC 34.5      RDW 12.0      Platelet Count 315      % Neutrophils 79      % Lymphocytes 9      % Monocytes 9      % Eosinophils 1      % Basophils 1      % Immature Granulocytes 1      NRBCs per 100 WBC 0      Absolute Neutrophils 11.9 (*)     Absolute Lymphocytes 1.4      Absolute Monocytes 1.3      Absolute Eosinophils 0.1      Absolute Basophils 0.1      Absolute Immature Granulocytes 0.2      Absolute NRBCs 0.0     BASIC METABOLIC PANEL - Normal    Sodium 137      Potassium 4.1      Chloride 102      Carbon Dioxide (CO2) 25      Anion Gap 10      Urea Nitrogen 20      Creatinine 1.16      Calcium 9.4      Glucose 117      GFR Estimate 78         RADIOLOGY:  CT Abdomen Pelvis w/o Contrast   Final Result   IMPRESSION:    1.  Few bilateral intrarenal stones. No ureteral stone or hydronephrosis.   2.  Colonic diverticula without acute diverticulitis. No bowel obstruction or inflammation.   3.  Fatty infiltration of the liver.               I, Felicia Mercer, am serving as a scribe to document services personally performed by Dr. Karena Thomson  based on my observation and the provider's statements to me. I, Karena Thomson MD attest that Felicia Mercer is acting in a scribe capacity, has observed my performance of the services and has documented them in accordance with my direction.      Karena Thomson M.D.  Emergency Medicine  [facility] EMERGENCY ROOM  [address] [facility] [id number] [phone]  Dept: [phone]     Berto  MD Karena  10/05/22 0202

## 2022-12-02 ENCOUNTER — TELEPHONE (OUTPATIENT)
Dept: FAMILY MEDICINE | Facility: CLINIC | Age: 47
End: 2022-12-02

## 2022-12-02 NOTE — TELEPHONE ENCOUNTER
Medication Question or Refill        What medication are you calling about (include dose and sig)?:     Pt is needing a vacation override and needing 90 day supply.  He states pharmacy will only give him 30 at a time and has no refills left.  amLODIPine (NORVASC) 10 MG tablet 90 tablet 3 5/13/2022  No   Sig - Route: Take 1 tablet (10 mg) by mouth daily - Oral   Sent to pharmacy as: amLODIPine Besylate 10 MG Oral Tablet (NORVASC)   Class: E-Prescribe   Order: [id number]   E-Prescribing Status: Receipt confirmed by pharmacy (5/13/2022  4:05 PM CDT)     lisinopril (ZESTRIL) 10 MG tablet 90 tablet 3 6/30/2022  No   Sig: TAKE 1 TABLET(10 MG) BY MOUTH DAILY   Sent to pharmacy as: Lisinopril 10 MG Oral Tablet (ZESTRIL)   Class: E-Prescribe   Order: [id number]   E-Prescribing Status: Receipt confirmed by pharmacy (6/30/2022  8:23 PM CDT)         Controlled Substance Agreement on file:   CSA -- Patient Level:    CSA: None found at the patient level.       Who prescribed the medication?: Dr Mares    Do you need a refill? Yes: pt is traveling and is needing an vacation override and needing 90 days( he states that the pharmacy is only giving him 30 days at a tinme)  I advised him to call his insurance to  if he needs to use another pharmacy for 90 day scripts    When did you use the medication last? n/a    Patient offered an appointment? No    Do you have any questions or concerns?  No    Preferred Pharmacy:[id number] - [place], [place] - [address] [place] [address]  Phone: [phone] Fax: [phone]      Okay to leave a detailed message?: Yes at Cell number on file:    Telephone Information:   Mobile [phone]

## 2022-12-05 NOTE — TELEPHONE ENCOUNTER
Spoke with patient and he reported that he was able to get this worked out with his pharmacy and does have the 90 day prescription now.

## 2022-12-31 ENCOUNTER — NURSE TRIAGE (OUTPATIENT)
Dept: NURSING | Facility: CLINIC | Age: 47
End: 2022-12-31

## 2022-12-31 NOTE — TELEPHONE ENCOUNTER
Nurse Triage    Is this a 2nd Level Triage? NO    Situation: Patient calling with concerns for a cough.  Consent: not needed    Background: Patient states he just returned from travel in the [place]. He hs had a cough for about 2 weeks. He's tested negative for Covid. He reports that he has coughing spells that cause SOB, when not coughing he has no SOB. Pt states over the past 5 days his cough has been worse and he typically does not get over a cough easily. He has tried OTC cough medicine, humidity and home cough remedies without any relief.     Assessment: Patient denies chest pain, severe difficulty breathing, discoloration of his face/lips, or fever. Patient states that he does occasionally vomit due to coughing so hard. He also reports nasal congestion.     Protocol Recommended Disposition:   See PCP Within 24 Hours, See More Appropriate Guideline    Recommendation: Advised patient to see HCP within 24 hours/urgent care. Urgent care hours provided for New Year's Kristina day. Care advice given. Reviewed concerning symptoms and when to call back.     Livia Adams RN [facility] 12/31/2022 5:03 AM    Reason for Disposition    Cough is main symptom    SEVERE coughing spells (e.g., whooping sound after coughing, vomiting after coughing)    Additional Information    Negative: SEVERE difficulty breathing (e.g., struggling for each breath, speaks in single words)    Negative: Sounds like a life-threatening emergency to the triager    Negative: [1] Difficulty breathing AND [2] not from stuffy nose (e.g., not relieved by cleaning out the nose)    Negative: Runny nose is caused by pollen or other allergies    Negative: SEVERE difficulty breathing (e.g., struggling for each breath, speaks in single words)    Negative: Bluish (or gray) lips or face now    Negative: [1] Difficulty breathing AND [2] exposure to flames, smoke, or fumes    Negative: [1] Stridor AND [2] difficulty breathing    Negative: Sounds like a  life-threatening emergency to the triager    Negative: [1] Previous asthma attacks AND [2] this feels like asthma attack    Negative: Dry cough (non-productive;  no sputum or minimal clear sputum)    Negative: [1] MODERATE difficulty breathing (e.g., speaks in phrases, SOB even at rest, pulse 100-120) AND [2] still present when not coughing    Negative: Chest pain  (Exception: MILD central chest pain, present only when coughing)    Negative: Patient sounds very sick or weak to the triager    Negative: [1] MILD difficulty breathing (e.g., minimal/no SOB at rest, SOB with walking, pulse <100) AND [2] still present when not coughing    Negative: [1] Coughed up blood AND [2] > 1 tablespoon (15 ml)  (Exception: Blood-tinged sputum.)    Negative: Fever > 103 F (39.4 C)    Negative: [1] Fever > 101 F (38.3 C) AND [2] age > 60 years    Negative: [1] Fever > 100.0 F (37.8 C) AND [2] bedridden (e.g., nursing home patient, CVA, chronic illness, recovering from surgery)    Negative: [1] Fever > 100.0 F (37.8 C) AND [2] diabetes mellitus or weak immune system (e.g., HIV positive, cancer chemo, splenectomy, organ transplant, chronic steroids)    Negative: Wheezing is present    Protocols used: COMMON COLD-A-AH, COUGH - ACUTE [id number]

## 2023-01-02 ENCOUNTER — TELEPHONE (OUTPATIENT)
Dept: FAMILY MEDICINE | Facility: CLINIC | Age: 48
End: 2023-01-02

## 2023-01-02 ENCOUNTER — NURSE TRIAGE (OUTPATIENT)
Dept: NURSING | Facility: CLINIC | Age: 48
End: 2023-01-02

## 2023-01-02 DIAGNOSIS — R05.1 ACUTE COUGH: Primary | ICD-10-CM

## 2023-01-02 RX ORDER — BENZONATATE 100 MG/1
100 CAPSULE ORAL 3 TIMES DAILY PRN
Qty: 30 CAPSULE | Refills: 0 | Status: SHIPPED | OUTPATIENT
Start: 2023-01-02 | End: 2023-11-07

## 2023-01-02 RX ORDER — PREDNISONE 10 MG/1
10 TABLET ORAL
COMMUNITY
Start: 2022-12-31 | End: 2023-01-05

## 2023-01-02 RX ORDER — NIRMATRELVIR AND RITONAVIR 300-100 MG
KIT ORAL
COMMUNITY
Start: 2022-12-31 | End: 2023-01-05

## 2023-01-02 NOTE — TELEPHONE ENCOUNTER
General Call      Reason for Call:  Pt was diagnosed with Covid over the weekend and given Prednisone and Paxlivad, but is requesting something for his cough    Pharm:  [facility] [address]    Please advise    What are your questions or concerns:  n/a    Date of last appointment with provider: n/a    Okay to leave a detailed message?: Yes at Home number on file [phone] (home)

## 2023-01-02 NOTE — TELEPHONE ENCOUNTER
Please have the patient schedule an appointment for further evaluation as his symptoms are quite significant and I am not comfortable managing this without seeing him especially because I have never met him.

## 2023-01-02 NOTE — TELEPHONE ENCOUNTER
Spoke with patient.     He was upset by hearing that he will need a provider evaluation for a prescription. Patient reported that he did not understand this because he was seen this weekend why he needed an appointment for further prescriptions. Pt had to go to the ED due to local urgent care being closed when he needed care over the weekend.     It was explained that providers need to evaluate to safely prescribe medications, especially if it is an acute event / patient is not well known to a provider.     Pt stated understanding, he was scheduled to see Suzan tomorrow at noon.     I informed patient that I will send this message to Dr. Mares so that she can determine whether or not she would like to evaluate patient prior to determining if there is a prescription that might work for patient.

## 2023-01-02 NOTE — TELEPHONE ENCOUNTER
Pt was seen in [facility] system over the weekend / diagnosed with Covid and provided the following prescriptions:        Disp Refills Start End GINA   nirmatrelvir and ritonavir (PAXLOVID, 300/100,) therapy pack   12/31/2022 1/5/2023 No   Sig: Take 2 nirmatrelvir 150 mg pink-oval tablets and 1 ritonavir 100 mg white-oval tablet together twice daily for 5 days. Date of Symptom Onset: ; no result in last 6 months      Disp Refills Start End GINA   predniSONE (DELTASONE) 10 MG tablet   12/31/2022 1/5/2023 --   Sig - Route: Take 10 mg by mouth Take 1 Tablet (10 mg) by mouth once daily with a meal for 5 days. - Oral

## 2023-01-02 NOTE — TELEPHONE ENCOUNTER
Patient stated he tried over the counter stuff and it does not work and would want something prescribe instead.

## 2023-01-03 ENCOUNTER — VIRTUAL VISIT (OUTPATIENT)
Dept: FAMILY MEDICINE | Facility: CLINIC | Age: 48
End: 2023-01-03
Payer: COMMERCIAL

## 2023-01-03 DIAGNOSIS — R05.1 ACUTE COUGH: Primary | ICD-10-CM

## 2023-01-03 PROCEDURE — 99213 OFFICE O/P EST LOW 20 MIN: CPT | Mod: CS | Performed by: FAMILY MEDICINE

## 2023-01-03 RX ORDER — LANSOPRAZOLE 30 MG/1
30 CAPSULE, DELAYED RELEASE ORAL 2 TIMES DAILY
Qty: 30 CAPSULE | Refills: 1 | Status: SHIPPED | OUTPATIENT
Start: 2023-01-03 | End: 2023-11-07

## 2023-01-03 RX ORDER — BENZOCAINE AND MENTHOL 15; 2.6 MG/1; MG/1
1 LOZENGE ORAL
Qty: 64 LOZENGE | Refills: 0 | Status: SHIPPED | OUTPATIENT
Start: 2023-01-03 | End: 2023-11-07

## 2023-01-03 NOTE — TELEPHONE ENCOUNTER
Nurse Triage SBAR    Situation: Covid    Background: Friend, Ayanna, calling. Consent: No consent on file Covid positive. He has been on Prednisone and Paxlovid. Cough syrup with codeine.     Assessment: Ayanna states the patient is not doing well. Ayanna noted thinks the patient may also have an eye infection. RN contacted the patient. Coughing to the point of vomiting.  He has not been able to sleep, he has not slept in 5 days.  The medication is not working. Patient is having a lot of coughing fits. No chest pain. Moderate shortness of breath. Headaches. Can only hear out of one ear. Left ear hearing loss after coughing.    Protocol Recommended Disposition: Emergency Department    Recommendation: According to the protocol, Patient should go to the ED now. Advised Patient to go to the ED now. Care advice given. Patient declined disposition and stated he is going to go into his appt tomorrow.     Felicia Willis RN Nursing Advisor 1/2/2023 10:40 PM     Reason for Disposition    MODERATE difficulty breathing (e.g., speaks in phrases, SOB even at rest, pulse 100-120)    Additional Information    Negative: Difficult to awaken or acting confused (e.g., disoriented, slurred speech)    Negative: SEVERE difficulty breathing (e.g., struggling for each breath, speaks in single words)    Negative: Bluish (or gray) lips or face now    Negative: Shock suspected (e.g., cold/pale/clammy skin, too weak to stand, low BP, rapid pulse)    Negative: Sounds like a life-threatening emergency to the triager    Negative: [1] Diagnosed or suspected COVID-19 AND [2] symptoms lasting 3 or more weeks    Negative: [1] COVID-19 exposure AND [2] no symptoms    Negative: COVID-19 vaccine reaction suspected (e.g., fever, headache, muscle aches) occurring 1 to 3 days after getting vaccine    Negative: COVID-19 vaccine, questions about    Negative: [1] Lives with someone known to have influenza (flu test positive) AND [2] flu-like symptoms (e.g., cough,  runny nose, sore throat, SOB; with or without fever)    Negative: [1] Adult with possible COVID-19 symptoms AND [2] triager concerned about severity of symptoms or other causes    Negative: COVID-19 and breastfeeding, questions about    Negative: SEVERE or constant chest pain or pressure  (Exception: Mild central chest pain, present only when coughing.)    Negative: Followed an ear injury    Negative: Decreased hearing with nasal allergies    Negative: Part of a cold    Negative: Followed air travel or mountain driving    Negative: See something in ear canal    Negative: Earwax, questions about    Negative: Dizziness is main symptom    Negative: Tinnitus (e.g., ringing, hissing, beating) is main symptom    Negative: [1] Ringing in the ears (tinnitus) AND [2] taking aspirin AND [3] dosage sounds high (i.e., > 1500 mg/day)    Negative: Patient sounds very sick or weak to the triager    Protocols used: CORONAVIRUS (COVID-19) DIAGNOSED OR [id number] 1.18.2022, HEARING LOSS OR CHANGE-A-

## 2023-01-03 NOTE — PROGRESS NOTES
"Vikas is a 47 year old who is being evaluated via a billable telephone visit.      What phone number would you like to be contacted at? [phone]  How would you like to obtain your AVS? Mail a copy    Distant Location (provider location):  On-site     Acute cough  With COVID-19 infection-treated with fexofenadine and prednisone.  Vikas has also tried an older prescription for benzonatate and Robitussin with codeine.  These are not helping.  Discussed with him that even though he does not feel reflux, this can contribute so would have him try an acid blocker twice a day.  Also recommend Cepacol lozenges as frequently as he needs them.  He could also try restarting Advair  - LANsoprazole (PREVACID) 30 MG DR capsule  Dispense: 30 capsule; Refill: 1  - benzocaine-menthol (CEPACOL EXTRA STRENGTH) 15-2.6 MG lozenge  Dispense: 64 lozenge; Refill: 0    Return if symptoms worsen or fail to improve.    Catina Mares MD  Family Medicine, [facility]          Subjective   Vikas is a 47 year old accompanied by his SELF, presenting for the following health issues:  Recheck Medication and Covid Concern (ONGOING COUGH- TESTED POSITIVE FOR COVID 12/31/2022.)      HPI     Got a cold  Ad cough in [place] when he was visiting this past December - recovered from it - cough worse when he landed. Seen [facility] ED on 12/31/22 and  tested positive for Covid 12/31/22. Treated with Paxlovid and prednisone. Cough started out as dry cough, now more phlegmy. Cough is frequent, persistent, prevents him from sleeping, has made him vomit and also \"blew out his ear.\".  He is using an old prescription of benzonatate and Robitussin with codeine.  Nothing works    When he was in [place] and before onset of Covid-19 tried taking Advair - cough has lasted for 3 weeks - stopped taking Advair when cough was going away     He does not feel heartburn - takes  some chewable       Therapies tried and outcome: CODEINE COUGH SYRUP, " OTC MEDICATION, INHALER    COVID-19 Symptom Review  How many days ago did these symptoms start? 12/31/2022    Are any of the following symptoms significant for you?    New or worsening difficulty breathing? No    Worsening cough? Yes, I am coughing up mucus AND DRY.    Fever or chills? No    Headache: YES    Sore throat: YES    Chest pain: No    Diarrhea: No    Body aches? YES    Also nasal congestion, runny nose,  Fatigue and loss of hearing in left ear    What treatments has patient tried? Cough syrup   Does patient live in a nursing home, group home, or shelter? No  Does patient have a way to get food/medications during quarantined? NO                Objective       Vitals:  No vitals were obtained today due to virtual visit.    Physical Exam     PSYCH: Alert and oriented times 3; coherent speech, normal   rate and volume, able to articulate logical thoughts, able   to abstract reason, no tangential thoughts, no hallucinations   or delusions  His affect is tired and frustrates  RESP: Vikas can hardly talk for coughing and sound hoarse. No audible wheezing, able to talk in full sentences (when he is not coughing)  Remainder of exam unable to be completed due to telephone visits      Phone call duration: 15 minutes  12:37 - 12:52

## 2023-01-03 NOTE — TELEPHONE ENCOUNTER
Just diagnosed 12/31/22 - on paxlovid but may still be infectious - WOULD NOT HAVE HIM COME INTO THE CLINIC.    You are right that normally I would like to see people. In this case would offer him (and will send in now) benzonatate without prescription.  If this does not work, the video visit please

## 2023-01-19 ENCOUNTER — NURSE TRIAGE (OUTPATIENT)
Dept: NURSING | Facility: CLINIC | Age: 48
End: 2023-01-19
Payer: COMMERCIAL

## 2023-01-19 NOTE — TELEPHONE ENCOUNTER
Nurse Triage SBAR    Is this a 2nd Level Triage? YES, LICENSED PRACTITIONER REVIEW IS REQUIRED    Situation: Patient calling with new onset swelling/redness on L leg after Covid and long distance plane travel.  Consent: not needed    Background: Pain in L Leg last few days.  Injured a couple years ago.  Just got over Covid - notes it feels swollen and tight.      Assessment:   * Pain started:  3-4 days ago.    * L leg - on inside of knee - goes a little down on the top and bottom of knee but on the side.    * Pain 6/10 per pt.  Noting when he bends the knee it feels really tight.  Hurts most when he stands, but lets up a little once walking.  Noting a sharp pain at times.    * Denies any recent work or exercise.    * Notes the area is red.  States it is tender to touch.    * Feels tight - feels a little swollen.  Notes visual swelling on inside of knee area too.    * denies back or chest pain or difficulty breathing.       Protocol Recommended Disposition:   No disposition on file.    Recommendation: Advised patient to wait for call-back after speaking with on-call provider . Reviewed concerning symptoms and when to call back.     Routed to provider    Does the patient meet one of the following criteria for ADS visit consideration? 16+ years old, with an [facility] PCP     TIP  Providers, please consider if this condition is appropriate for management at one of our Acute and Diagnostic Services sites.     If patient is a good candidate, please use dotphrase <dot>triageresponse and select Refer to ADS to document.     Nataliia Sagastume RN [facility] 1/19/2023 4:02 PM    Reason for Disposition    Long-distance travel in past month (e.g., car, bus, train, plane; with trip lasting 6 or more hours)    Additional Information    Negative: Looks like a broken bone or dislocated joint (e.g., crooked or deformed)    Negative: Sounds like a life-threatening emergency to the triager    Negative: Followed a hip  injury    Negative: Followed a knee injury    Negative: Followed an ankle or foot injury    Negative: Back pain radiating (shooting) into leg(s)    Negative: Foot pain is main symptom    Negative: Ankle pain is main symptom    Negative: Knee pain is main symptom    Negative: Leg swelling is main symptom    Negative: Chest pain    Negative: Difficulty breathing    Negative: Unable to walk    Negative: Entire foot is cool or blue in comparison to other side    Negative: Fever and red area (or area very tender to touch)    Negative: Swollen joint and fever    Negative: Thigh or calf pain in only one leg and present > 1 hour    Negative: Thigh, calf, or ankle swelling in only one leg    Negative: Thigh, calf, or ankle swelling in both legs, but one side is definitely more swollen    Negative: History of prior 'blood clot' in leg or lungs (i.e., deep vein thrombosis, pulmonary embolism)    Negative: History of inherited increased risk of blood clots (e.g., factor 5 Leiden, antithrombin 3, protein C or protein S deficiency, prothrombin mutation)    Negative: Major surgery in past month    Negative: Hip or leg fracture (broken bone) in past month (or had cast on leg or ankle in past month)    Negative: Illness requiring prolonged bedrest in past month (e.g., immobilization, long hospital stay)    Protocols used: LEG PAIN-A-OH

## 2023-01-20 ENCOUNTER — ANCILLARY PROCEDURE (OUTPATIENT)
Dept: ULTRASOUND IMAGING | Facility: CLINIC | Age: 48
End: 2023-01-20
Attending: STUDENT IN AN ORGANIZED HEALTH CARE EDUCATION/TRAINING PROGRAM
Payer: COMMERCIAL

## 2023-01-20 ENCOUNTER — HOSPITAL ENCOUNTER (EMERGENCY)
Facility: CLINIC | Age: 48
Discharge: HOME | End: 2023-01-20
Attending: STUDENT IN AN ORGANIZED HEALTH CARE EDUCATION/TRAINING PROGRAM | Admitting: STUDENT IN AN ORGANIZED HEALTH CARE EDUCATION/TRAINING PROGRAM
Payer: COMMERCIAL

## 2023-01-20 ENCOUNTER — NURSE TRIAGE (OUTPATIENT)
Dept: NURSING | Facility: CLINIC | Age: 48
End: 2023-01-20
Payer: COMMERCIAL

## 2023-01-20 VITALS
TEMPERATURE: 96.6 F | RESPIRATION RATE: 20 BRPM | DIASTOLIC BLOOD PRESSURE: 84 MMHG | BODY MASS INDEX: 37.3 KG/M2 | HEIGHT: 75 IN | HEART RATE: 98 BPM | SYSTOLIC BLOOD PRESSURE: 136 MMHG | WEIGHT: 300 LBS | OXYGEN SATURATION: 98 %

## 2023-01-20 DIAGNOSIS — L03.116 CELLULITIS OF LEG, LEFT: ICD-10-CM

## 2023-01-20 DIAGNOSIS — S80.12XA LEG HEMATOMA, LEFT, INITIAL ENCOUNTER: ICD-10-CM

## 2023-01-20 PROCEDURE — 10061 I&D ABSCESS COMP/MULTIPLE: CPT

## 2023-01-20 PROCEDURE — 93971 EXTREMITY STUDY: CPT | Mod: LT

## 2023-01-20 PROCEDURE — 87070 CULTURE OTHR SPECIMN AEROBIC: CPT | Performed by: STUDENT IN AN ORGANIZED HEALTH CARE EDUCATION/TRAINING PROGRAM

## 2023-01-20 PROCEDURE — 76882 US LMTD JT/FCL EVL NVASC XTR: CPT | Mod: LT

## 2023-01-20 PROCEDURE — 250N000011 HC RX IP 250 OP 636: Performed by: STUDENT IN AN ORGANIZED HEALTH CARE EDUCATION/TRAINING PROGRAM

## 2023-01-20 PROCEDURE — 250N000009 HC RX 250: Performed by: STUDENT IN AN ORGANIZED HEALTH CARE EDUCATION/TRAINING PROGRAM

## 2023-01-20 PROCEDURE — 99285 EMERGENCY DEPT VISIT HI MDM: CPT | Mod: 25

## 2023-01-20 RX ORDER — CEPHALEXIN 500 MG/1
500 CAPSULE ORAL 4 TIMES DAILY
Qty: 40 CAPSULE | Refills: 0 | Status: SHIPPED | OUTPATIENT
Start: 2023-01-20 | End: 2023-01-30

## 2023-01-20 RX ORDER — METHYLCELLULOSE 4000CPS 30 %
POWDER (GRAM) MISCELLANEOUS
Status: DISCONTINUED | OUTPATIENT
Start: 2023-01-20 | End: 2023-01-20 | Stop reason: HOSPADM

## 2023-01-20 RX ADMIN — EPINEPHRINE BITARTRATE 3 ML: 1 POWDER at 10:23

## 2023-01-20 ASSESSMENT — ACTIVITIES OF DAILY LIVING (ADL)
ADLS_ACUITY_SCORE: 35

## 2023-01-20 NOTE — TELEPHONE ENCOUNTER
"Pt calls again.  (See previous triage encounter of 1/19/23).    Pain along inner aspect of L knee has worsened.  Swollen.  No new injuries.  Leg \"feels tight.\"  \"More painful today with any movement.\"  Unable to walk without limping.    These symptoms follow an episode of covid illness as well as long distance air travel.    Advised prompt ED eval.  Pt agrees to plan.  States intention to go to [facility][1] now.    Melanie [facility] Nurse Advisor     "

## 2023-01-20 NOTE — ED PROVIDER NOTES
EMERGENCY DEPARTMENT ENCOUNTER       ED Course & Medical Decision Making     8:22 AM I met with the patient, obtained history, performed an initial exam, and discussed options and plan for diagnostics and treatment here in the ED.   12:32 PM I performed an abscess incision and drainage.  12:44 PM  I wrapped the patient's leg. He's ready for discharge.    Final Impression  47 year old male presents for evaluation of pain, redness, swelling to the left thigh over the past 3 days.  Patient initially presented for concern of possible blood clot/DVT as he had recently traveled from the [place], tested positive for COVID on 12/29/2022, though COVID symptoms have subsequently resolved.  Denies any history of DVT or PE.  Denies being on blood thinners.  Denies any trauma or injury to that area of his thigh.  On exam patient has a 6 x 11 cm area of warmth, erythema, induration and tenderness along his left distal medial thigh just above the knee, appears to be cellulitis versus abscess.  Formal ultrasound negative for DVT.  Bedside ultrasound shows a multilobulated complex fluid collection concerning for abscess versus hematoma.  Given the warmth and erythema as well as tenderness of the area as well as the absence of any history of traumatic injury that would have caused hematoma, greater suspicion was abscess thus an incision and drainage was performed as documented below.  I&D returned significant amount of clot/hematoma, though no purulence.  Swab was sent for culture, though suspect will return negative for abscess.  Would still treat as cellulitis with course of Keflex.  Findings and plan discussed with patient, will discharge home with course of Keflex and close return precautions.  Follow-up in the primary care clinic in about a week to make sure that things are healing well.    Prior to making a final disposition on this patient the results of patient's tests and other diagnostic studies were discussed with the patient.  All questions were answered. Patient expressed understanding of the plan and was amenable to it.    Medical Decision Making    History:    Supplemental history from: Documented in chart, if applicable    External Record(s) reviewed: Documented in chart, if applicable.    Work Up:    Chart documentation includes differential considered and any EKGs or imaging independently interpreted by provider, where specified.    In additional to work up documented, I considered the following work up: Documented in chart, if applicable.    External consultation:    Discussion of management with another provider: Documented in chart, if applicable    Complicating factors:    Care impacted by chronic illness: N/A    Care affected by social determinants of health: N/A    Disposition considerations: Discharge. I prescribed additional prescription strength medication(s) as charted. Admission consideration documented above, if applicable.        PROCEDURE: Incision and Drainage   INDICATIONS: Localized abscess   PROCEDURE PROVIDER: Dr Nomi Moulton   SITE: Left thigh, just medial to knee   MEDICATION: 20 mLs of 0.5% Bupivacaine with epinephrine   NOTE: The area was prepped with chlorhexidine and draped off in the usual sterile fashion.  Local anesthetic was injected subcutaneously with anesthesia effects demonstrated prior to proceeding.  The area of maximal fluctuance was opened with a # 11 Blade ([place]) using a Single Straight incision to allow for drainage.  Significant amount of clotted blood/hematoma expressed from the cavity.  No purulence was returned. No Packing was placed.  A sterile dressing was placed over the area.   COMPLEXITY: Complex    Simple = single, furuncle, paronychia, superficial  Complex = multiple or abscess requiring probing, loculations, packing placement   COMPLICATIONS: Patient tolerated procedure well, without complication       PROCEDURE: Emergency Department Limited Bedside Screening Ultrasound    ANATOMICAL WINDOW: Left thigh, distal, medial   INDICATIONS: Pain, redness, swelling   PROCEDURE PROVIDER:   Nomi Moulton   FINDINGS:  6 x 11 cm area of complex fluid collection with multiple lobulations/loculations.  No vascular flow identified within affected area.   IMAGES SAVED AND STORED FOR ARCHIVE AND REVIEW: Yes         Medications   methylcellulose powder (has no administration in time range)   lidocaine/EPINEPHrine/tetracaine (LET) solution KIT 3 mL (3 mLs Topical Given 1/20/23 1023)       New Prescriptions    CEPHALEXIN (KEFLEX) 500 MG CAPSULE    Take 1 capsule (500 mg) by mouth 4 times daily for 10 days       Final Impression     1. Leg hematoma, left, initial encounter    2. Cellulitis of leg, left        Chief Complaint     Chief Complaint   Patient presents with     Leg Swelling       Pt here with left leg swelling, redness and firm to the touch area lateral to his left knee. Also very painful to touch. Pt had Covid December 31, right after he flew back from the [place]. Denies shortness of breath.     HPI     Vikas Lees is a 47 year old male who presents for evaluation of leg swelling and pain.    Patient reports 3 days of pain and swelling to his inner left leg. He is concerned of a blood clot or infection. He denies any recent injuries, and has only had prior ligament injuries. Patient denies any wounds to either of his feet, but notes he is treating a toenail fungus. No fevers or vomiting. No prior cellulitis or abscess. No history of diabetes. He notes he had a recent trip to the [place], and flew back first class on 12/29/2022 then tested positive for covid just after. He has since recovered from covid. No other current complaints.    I, Mony Boucher am serving as a scribe to document services personally performed by Dr. Nomi Moulton MD, based on my observation and the provider's statements to me. I, Dr. Nomi Moulton MD attest that Mony Boucher is acting in a scribe capacity, has  "observed my performance of the services and has documented them in accordance with my direction.    Past Medical History     No past medical history on file.  Past Surgical History:   Procedure Laterality Date     HERNIA REPAIR  2004    inguinal     INGUINAL HERNIA REPAIR       Family History   Problem Relation Age of Onset     Arthritis Mother      Breast Cancer Mother      Cerebrovascular Disease Mother      Diabetes Maternal Grandfather      Cerebrovascular Disease Maternal Grandfather       Social History     Tobacco Use     Smoking status: Never     Smokeless tobacco: Never   Substance Use Topics     Alcohol use: Yes     Comment: moderate     Drug use: No     No Known Allergies    Relevant past medical, surgical, family and social history as documented above, has been reviewed and discussed with patient. No changes or additions, unless otherwise noted in the HPI.    Current Medications     cephALEXin (KEFLEX) 500 MG capsule  amLODIPine (NORVASC) 10 MG tablet  benzocaine-menthol (CEPACOL EXTRA STRENGTH) 15-2.6 MG lozenge  benzonatate (TESSALON) 100 MG capsule  LANsoprazole (PREVACID) 30 MG DR capsule  lisinopril (ZESTRIL) 10 MG tablet  methylPREDNISolone (MEDROL DOSEPAK) 4 MG tablet therapy pack  mometasone-formoterol (DULERA) 100-5 MCG/ACT inhaler      Review of Systems     Constitutional: Denies fever, chills  Cardiovascular:  Positive for leg swelling and pain.    Remainder of systems reviewed, unless noted in HPI all others negative.    Physical Exam     /83   Pulse 96   Temp (!) 96.6  F (35.9  C)   Resp 20   Ht 1.905 m (6' 3\")   Wt 136.1 kg (300 lb)   SpO2 97%   BMI 37.50 kg/m    Constitutional: Awake, alert, in no acute distress.      Head: Normocephalic, atraumatic.      ENT: Mucous membranes moist.      Eyes: PERRL, Conjunctiva normal.      Respiratory: Respirations even, unlabored. Lungs clear to ascultation bilaterally, in no acute respiratory distress.      Cardiovascular: Regular rate " and rhythm. +2 radial pulses, equal bilaterally.   GI: Abdomen soft, non-tender to palpation in all 4 quadrants. No guarding or rebound. Bowel sounds intact on all 4 quadrants.      Musculoskeletal: Moves all 4 extremities equally, strength symmetrical on bilateral uppers and lowers.      Integument: Warm, dry.  6 x 11 cm area of warmth, erythema, induration and tenderness along the left distal thigh just above and medial to the knee.  Neurologic: Alert & oriented x 3. Normal speech. Grossly normal motor and sensory function. No focal deficits noted.      Psychiatric: Normal mood and affect.     Labs & Imaging     Results for orders placed or performed during the hospital encounter of 01/20/23   US Lower Extremity Venous Duplex Left    Impression    IMPRESSION:  1.  No deep venous thrombosis in the left lower extremity.        Nomi Moulton MD  01/20/23 8465

## 2023-01-20 NOTE — ED TRIAGE NOTES
Pt here with left leg swelling, redness and firm to the touch area lateral to his left knee. Also very painful to touch. Pt had Covid December 31, right after he flew back from the [place]. Denies shortness of breath.

## 2023-01-20 NOTE — TELEPHONE ENCOUNTER
"Pt calls again.  (See previous triage encounter of 1/19/23).    Pain along inner aspect of L knee has worsened.  Swollen.  No new injuries.  Leg \"feels tight.\"  \"More painful today with any movement.\"  Unable to walk without limping.    These symptoms follow an episode of covid illness as well as long distance air travel.    Advised prompt ED eval.  Pt agrees to plan.  States intention to go to [facility] now.    Melanie SP[facility]KS [facility] Nurse Advisor     Reason for Disposition    Unable to walk    Additional Information    Negative: Looks like a broken bone or dislocated joint (e.g., crooked or deformed)    Negative: Sounds like a life-threatening emergency to the triager    Negative: Followed a hip injury    Negative: Followed a knee injury    Negative: Followed an ankle or foot injury    Negative: Back pain radiating (shooting) into leg(s)    Negative: Foot pain is main symptom    Negative: Ankle pain is main symptom    Negative: Knee pain is main symptom    Negative: Leg swelling is main symptom    Negative: Chest pain    Negative: Difficulty breathing    Negative: Entire foot is cool or blue in comparison to other side    Protocols used: LEG PAIN-A-OH      "

## 2023-01-25 DIAGNOSIS — I10 BENIGN ESSENTIAL HYPERTENSION: ICD-10-CM

## 2023-01-25 LAB
BACTERIA ABSC ANAEROBE+AEROBE CULT: NO GROWTH
GRAM STAIN RESULT: NORMAL
GRAM STAIN RESULT: NORMAL

## 2023-01-26 RX ORDER — LISINOPRIL 10 MG/1
TABLET ORAL
Qty: 90 TABLET | Refills: 3 | OUTPATIENT
Start: 2023-01-26

## 2023-01-26 NOTE — TELEPHONE ENCOUNTER
Refilled 6/30/22 with 3 refills.  Current script represents a 90 day supply and has refills remaining.

## 2023-02-26 DIAGNOSIS — I10 BENIGN ESSENTIAL HYPERTENSION: ICD-10-CM

## 2023-02-27 RX ORDER — LISINOPRIL 10 MG/1
TABLET ORAL
Qty: 90 TABLET | Refills: 2 | Status: SHIPPED | OUTPATIENT
Start: 2023-02-27 | End: 2023-09-27

## 2023-02-27 NOTE — TELEPHONE ENCOUNTER
"Last Written Prescription Date:  6/30/22  Last Fill Quantity: 90,  # refills: 3   Last office visit provider:  1/3/23     Requested Prescriptions   Pending Prescriptions Disp Refills     lisinopril (ZESTRIL) 10 MG tablet [Pharmacy Med Name: LISINOPRIL 10MG TABLETS] 90 tablet 3     Sig: TAKE 1 TABLET(10 MG) BY MOUTH DAILY       ACE Inhibitors (Including Combos) Protocol Passed - 2/26/2023  5:59 AM        Passed - Blood pressure under 140/90 in past 12 months     BP Readings from Last 3 Encounters:   01/20/23 136/84   10/05/22 114/72   09/26/22 139/85                 Passed - Recent (12 mo) or future (30 days) visit within the authorizing provider's specialty     Patient has had an office visit with the authorizing provider or a provider within the authorizing providers department within the previous 12 mos or has a future within next 30 days. See \"Patient Info\" tab in inbasket, or \"Choose Columns\" in Meds & Orders section of the refill encounter.              Passed - Medication is active on med list        Passed - Patient is age 18 or older        Passed - Normal serum creatinine on file in past 12 months     Recent Labs   Lab Test 10/05/22  0007   CR 1.16       Ok to refill medication if creatinine is low          Passed - Normal serum potassium on file in past 12 months     Recent Labs   Lab Test 10/05/22  0007   POTASSIUM 4.1                  Yesenia Vaughan, RN 02/27/23 1:03 AM  "

## 2023-05-18 DIAGNOSIS — I10 BENIGN ESSENTIAL HYPERTENSION: ICD-10-CM

## 2023-05-19 RX ORDER — AMLODIPINE BESYLATE 10 MG/1
TABLET ORAL
Qty: 90 TABLET | Refills: 2 | Status: SHIPPED | OUTPATIENT
Start: 2023-05-19 | End: 2023-12-11

## 2023-05-19 NOTE — TELEPHONE ENCOUNTER
"Last Written Prescription Date:  5/13/2022  Last Fill Quantity: 90,  # refills: 3   Last office visit provider:  1/3/2023     Requested Prescriptions   Pending Prescriptions Disp Refills     amLODIPine (NORVASC) 10 MG tablet [Pharmacy Med Name: AMLODIPINE BESYLATE 10MG TABLETS] 90 tablet 3     Sig: TAKE 1 TABLET(10 MG) BY MOUTH DAILY       Calcium Channel Blockers Protocol  Passed - 5/18/2023  8:20 AM        Passed - Blood pressure under 140/90 in past 12 months     BP Readings from Last 3 Encounters:   01/20/23 136/84   10/05/22 114/72   09/26/22 139/85                 Passed - Recent (12 mo) or future (30 days) visit within the authorizing provider's specialty     Patient has had an office visit with the authorizing provider or a provider within the authorizing providers department within the previous 12 mos or has a future within next 30 days. See \"Patient Info\" tab in inbasket, or \"Choose Columns\" in Meds & Orders section of the refill encounter.              Passed - Medication is active on med list        Passed - Patient is age 18 or older        Passed - Normal serum creatinine on file in past 12 months     Recent Labs   Lab Test 10/05/22  0007   CR 1.16       Ok to refill medication if creatinine is low               Trinh Cannon RN 05/19/23 9:26 AM  "

## 2023-09-27 DIAGNOSIS — I10 BENIGN ESSENTIAL HYPERTENSION: ICD-10-CM

## 2023-09-27 RX ORDER — AMLODIPINE BESYLATE 10 MG/1
10 TABLET ORAL DAILY
Qty: 90 TABLET | Refills: 2 | OUTPATIENT
Start: 2023-09-27

## 2023-09-27 RX ORDER — LISINOPRIL 10 MG/1
10 TABLET ORAL DAILY
Qty: 90 TABLET | Refills: 0 | Status: SHIPPED | OUTPATIENT
Start: 2023-09-27 | End: 2023-12-05

## 2023-09-27 RX ORDER — LISINOPRIL 10 MG/1
10 TABLET ORAL DAILY
Qty: 90 TABLET | Refills: 2 | OUTPATIENT
Start: 2023-09-27

## 2023-09-27 NOTE — TELEPHONE ENCOUNTER
"Note to the pharmacy:     \"Patient has refills on file with [id number] - [place], [place] - [address] AT [facility] & [facility]. Please transfer script if needed. Thank you!\"    Shasha Martinez RN    "

## 2023-09-27 NOTE — TELEPHONE ENCOUNTER
Pt states no refills on file-at pharmacy per pt.  please submit for pt     Disp Refills Start End GINA   lisinopril (ZESTRIL) 10 MG tablet

## 2023-09-28 NOTE — TELEPHONE ENCOUNTER
90 days given - No further refills without Follow up visit- Please contact patient to schedule annual exam must be by early January

## 2023-11-07 ENCOUNTER — OFFICE VISIT (OUTPATIENT)
Dept: FAMILY MEDICINE | Facility: CLINIC | Age: 48
End: 2023-11-07
Payer: COMMERCIAL

## 2023-11-07 VITALS
DIASTOLIC BLOOD PRESSURE: 82 MMHG | OXYGEN SATURATION: 98 % | RESPIRATION RATE: 11 BRPM | WEIGHT: 289 LBS | TEMPERATURE: 98.3 F | HEART RATE: 87 BPM | SYSTOLIC BLOOD PRESSURE: 134 MMHG | BODY MASS INDEX: 35.93 KG/M2 | HEIGHT: 75 IN

## 2023-11-07 DIAGNOSIS — R93.1 ABNORMAL ECHOCARDIOGRAM: ICD-10-CM

## 2023-11-07 DIAGNOSIS — Z00.00 ROUTINE GENERAL MEDICAL EXAMINATION AT A HEALTH CARE FACILITY: Primary | ICD-10-CM

## 2023-11-07 DIAGNOSIS — I35.0 NONRHEUMATIC AORTIC VALVE STENOSIS: ICD-10-CM

## 2023-11-07 DIAGNOSIS — B35.1 FUNGAL INFECTION OF TOENAIL: ICD-10-CM

## 2023-11-07 DIAGNOSIS — Z12.11 SCREEN FOR COLON CANCER: ICD-10-CM

## 2023-11-07 DIAGNOSIS — Z11.59 NEED FOR HEPATITIS C SCREENING TEST: ICD-10-CM

## 2023-11-07 DIAGNOSIS — R01.1 HEART MURMUR: ICD-10-CM

## 2023-11-07 DIAGNOSIS — Z51.81 ENCOUNTER FOR THERAPEUTIC DRUG MONITORING: ICD-10-CM

## 2023-11-07 DIAGNOSIS — Z11.4 SCREENING FOR HIV (HUMAN IMMUNODEFICIENCY VIRUS): ICD-10-CM

## 2023-11-07 DIAGNOSIS — Z13.220 SCREENING, LIPID: ICD-10-CM

## 2023-11-07 DIAGNOSIS — I10 BENIGN ESSENTIAL HYPERTENSION: ICD-10-CM

## 2023-11-07 LAB
ALBUMIN SERPL BCG-MCNC: 4.3 G/DL (ref 3.5–5.2)
ALP SERPL-CCNC: 64 U/L (ref 40–129)
ALT SERPL W P-5'-P-CCNC: 19 U/L (ref 0–70)
ANION GAP SERPL CALCULATED.3IONS-SCNC: 10 MMOL/L (ref 7–15)
AST SERPL W P-5'-P-CCNC: 15 U/L (ref 0–45)
BILIRUB SERPL-MCNC: 0.6 MG/DL
BUN SERPL-MCNC: 14.1 MG/DL (ref 6–20)
CALCIUM SERPL-MCNC: 9.4 MG/DL (ref 8.6–10)
CHLORIDE SERPL-SCNC: 103 MMOL/L (ref 98–107)
CHOLEST SERPL-MCNC: 186 MG/DL
CREAT SERPL-MCNC: 1.09 MG/DL (ref 0.67–1.17)
DEPRECATED HCO3 PLAS-SCNC: 26 MMOL/L (ref 22–29)
EGFRCR SERPLBLD CKD-EPI 2021: 84 ML/MIN/1.73M2
GLUCOSE SERPL-MCNC: 112 MG/DL (ref 70–99)
HCV AB SERPL QL IA: NONREACTIVE
HDLC SERPL-MCNC: 43 MG/DL
HIV 1+2 AB+HIV1 P24 AG SERPL QL IA: NONREACTIVE
LDLC SERPL CALC-MCNC: 95 MG/DL
NONHDLC SERPL-MCNC: 143 MG/DL
POTASSIUM SERPL-SCNC: 4.3 MMOL/L (ref 3.4–5.3)
PROT SERPL-MCNC: 7.7 G/DL (ref 6.4–8.3)
SODIUM SERPL-SCNC: 139 MMOL/L (ref 135–145)
TRIGL SERPL-MCNC: 240 MG/DL

## 2023-11-07 PROCEDURE — 99396 PREV VISIT EST AGE 40-64: CPT | Mod: 25 | Performed by: FAMILY MEDICINE

## 2023-11-07 PROCEDURE — 80053 COMPREHEN METABOLIC PANEL: CPT | Performed by: FAMILY MEDICINE

## 2023-11-07 PROCEDURE — 99213 OFFICE O/P EST LOW 20 MIN: CPT | Mod: 25 | Performed by: FAMILY MEDICINE

## 2023-11-07 PROCEDURE — 36415 COLL VENOUS BLD VENIPUNCTURE: CPT | Performed by: FAMILY MEDICINE

## 2023-11-07 PROCEDURE — 86803 HEPATITIS C AB TEST: CPT | Performed by: FAMILY MEDICINE

## 2023-11-07 PROCEDURE — 90682 RIV4 VACC RECOMBINANT DNA IM: CPT | Performed by: FAMILY MEDICINE

## 2023-11-07 PROCEDURE — 90471 IMMUNIZATION ADMIN: CPT | Performed by: FAMILY MEDICINE

## 2023-11-07 PROCEDURE — 87389 HIV-1 AG W/HIV-1&-2 AB AG IA: CPT | Performed by: FAMILY MEDICINE

## 2023-11-07 PROCEDURE — 80061 LIPID PANEL: CPT | Performed by: FAMILY MEDICINE

## 2023-11-07 RX ORDER — TERBINAFINE HYDROCHLORIDE 250 MG/1
250 TABLET ORAL DAILY
Qty: 90 TABLET | Refills: 0 | Status: SHIPPED | OUTPATIENT
Start: 2023-11-07 | End: 2024-02-05

## 2023-11-07 ASSESSMENT — ENCOUNTER SYMPTOMS
SORE THROAT: 0
NERVOUS/ANXIOUS: 0
WEAKNESS: 0
SHORTNESS OF BREATH: 0
FREQUENCY: 0
ABDOMINAL PAIN: 0
DIARRHEA: 0
ARTHRALGIAS: 0
DYSURIA: 0
DIZZINESS: 0
MYALGIAS: 0
HEMATOCHEZIA: 0
PARESTHESIAS: 0
HEADACHES: 0
PALPITATIONS: 0
HEMATURIA: 0
FEVER: 0
CHILLS: 0
COUGH: 1
CONSTIPATION: 0
HEARTBURN: 0
JOINT SWELLING: 0
NAUSEA: 0
EYE PAIN: 0

## 2023-11-07 NOTE — LETTER
"November 9, 2023      Vikas Lees  [address]  [place] [address]        Dear Vikas,    I am  writing about your test results.    Your electrolytes, kidney and liver function are normal except for an elevated blood sugar So you can start taking the terbinafine.  I would also like to check you for pre-diabetes (or even diabetes) -unfortunately this cannot be added onto the sample of blood you already gave.  At your convenience, please make a lab only visit for an A1c test-you do not have to be fasting to do this test.    Your triglycerides (\"fats\") are a little high.  Limiting your intake of sweets and other simple carbohydrates can help, as our body turns this into fats.    Your total cholesterol and LDL (\"bad cholesterol\") are normal.  Your non-HDL cholesterol, which reflects some plaque causing cholesterol that is not technically LDL is a little bit elevated.  This bears watching but I know you are working on diet and overall your cholesterol panel looks fine.    As you can see, HIV and hepatitis C screening tests are negative.    If you have any questions or concerns, please call the clinic at the number listed above.       Sincerely,      Catina Mares MD        Resulted Orders   HIV Antigen Antibody Combo   Result Value Ref Range    HIV Antigen Antibody Combo Nonreactive Nonreactive      Comment:      HIV-1 p24 Ag & HIV-1/HIV-2 Ab Not Detected   Hepatitis C Screen Reflex to HCV RNA Quant and Genotype   Result Value Ref Range    Hepatitis C Antibody Nonreactive Nonreactive    Narrative    Assay performance characteristics have not been established for newborns, infants, and children.   Lipid panel reflex to direct LDL Non-fasting   Result Value Ref Range    Cholesterol 186 <200 mg/dL    Triglycerides 240 (H) <150 mg/dL    Direct Measure HDL 43 >=40 mg/dL    LDL Cholesterol Calculated 95 <=100 mg/dL    Non HDL Cholesterol 143 (H) <130 mg/dL    Narrative    Cholesterol  Desirable:  <200 " mg/dL    Triglycerides  Normal:  Less than 150 mg/dL  Borderline High:  150-199 mg/dL  High:  200-499 mg/dL  Very High:  Greater than or equal to 500 mg/dL    Direct Measure HDL  Female:  Greater than or equal to 50 mg/dL   Male:  Greater than or equal to 40 mg/dL    LDL Cholesterol  Desirable:  <100mg/dL  Above Desirable:  100-129 mg/dL   Borderline High:  130-159 mg/dL   High:  160-189 mg/dL   Very High:  >= 190 mg/dL    Non HDL Cholesterol  Desirable:  130 mg/dL  Above Desirable:  130-159 mg/dL  Borderline High:  160-189 mg/dL  High:  190-219 mg/dL  Very High:  Greater than or equal to 220 mg/dL   Comprehensive metabolic panel (BMP + Alb, Alk Phos, ALT, AST, Total. Bili, TP)   Result Value Ref Range    Sodium 139 135 - 145 mmol/L      Comment:      Reference intervals for this test were updated on 09/26/2023 to more accurately reflect our healthy population. There may be differences in the flagging of prior results with similar values performed with this method. Interpretation of those prior results can be made in the context of the updated reference intervals.     Potassium 4.3 3.4 - 5.3 mmol/L    Carbon Dioxide (CO2) 26 22 - 29 mmol/L    Anion Gap 10 7 - 15 mmol/L    Urea Nitrogen 14.1 6.0 - 20.0 mg/dL    Creatinine 1.09 0.67 - 1.17 mg/dL    GFR Estimate 84 >60 mL/min/1.73m2    Calcium 9.4 8.6 - 10.0 mg/dL    Chloride 103 98 - 107 mmol/L    Glucose 112 (H) 70 - 99 mg/dL    Alkaline Phosphatase 64 40 - 129 U/L    AST 15 0 - 45 U/L      Comment:      Reference intervals for this test were updated on 6/12/2023 to more accurately reflect our healthy population. There may be differences in the flagging of prior results with similar values performed with this method. Interpretation of those prior results can be made in the context of the updated reference intervals.    ALT 19 0 - 70 U/L      Comment:      Reference intervals for this test were updated on 6/12/2023 to more accurately reflect our healthy population.  There may be differences in the flagging of prior results with similar values performed with this method. Interpretation of those prior results can be made in the context of the updated reference intervals.      Protein Total 7.7 6.4 - 8.3 g/dL    Albumin 4.3 3.5 - 5.2 g/dL    Bilirubin Total 0.6 <=1.2 mg/dL

## 2023-11-07 NOTE — PROGRESS NOTES
ASSESSMENT/PLAN:   Vikas was seen today for physical.    Diagnoses and all orders for this visit:    Routine general medical examination at a health care facility    Benign essential hypertension   -I can refill amlodipine and lisinopril when needed in the next year  -     Comprehensive metabolic panel (BMP + Alb, Alk Phos, ALT, AST, Total. Bili, TP)    Heart murmur - on exam  Abnormal echocardiogram - 10/19/21  - raised possibility of bicuspid aortic valve  Nonrheumatic aortic valve stenosis  - mild - also seen on this echocardiogram  Previously referred to cardiology for the above but he did not pursue the referral.  We will resubmit  -     Adult Cardiology Eval  Referral; Future    Fungal infection of toenail  -     terbinafine (LAMISIL) 250 MG tablet; Take 1 tablet (250 mg) by mouth daily for 90 days  Asked him not to start until we have normal liver functions back    Screen for colon cancer  -     Colonoscopy Screening  Referral; Future    Encounter for therapeutic drug monitoring  -     Comprehensive metabolic panel (BMP + Alb, Alk Phos, ALT, AST, Total. Bili, TP)    Screening for HIV (human immunodeficiency virus)  -     HIV Antigen Antibody Combo    Need for hepatitis C screening test  -     Hepatitis C Screen Reflex to HCV RNA Quant and Genotype    Screening, lipid  -     Lipid panel reflex to direct LDL Non-fasting    Other orders  -     HEPATITIS B, ADULT 20+ (ENGERIX-B/RECOMBIVAX HB); Standing  -     REVIEW OF HEALTH MAINTENANCE PROTOCOL ORDERS  -     INFLUENZA VACCINE 18-64Y (FLUBLOK)  -     PRIMARY CARE FOLLOW-UP SCHEDULING; Future    Return in about 1 year (around 11/7/2024) for Routine preventive, or earlier as needed.      COUNSELING:   Reviewed preventive health counseling, as reflected in patient instructions  Special attention given to:        Albuterol not for coughing      BMI:   Estimated body mass index is 36.12 kg/m  as calculated from the following:    Height as of  "this encounter: 1.905 m (6' 3\").    Weight as of this encounter: 131.1 kg (289 lb).   Weight management plan:  He has been losing weight and is continuing to work on healthier diet and exercising more      He reports that he has never smoked. He has never used smokeless tobacco.    Catina Mares MD  [facility]    SUBJECTIVE:   CC: Vikas is an 47 year old who presents for preventative health visit.       11/7/2023     8:09 AM   Additional Questions   Roomed by ZHEN Edwards       Healthy Habits:     Getting at least 3 servings of Calcium per day:  Yes    Bi-annual eye exam:  NO    Dental care twice a year:  Yes    Sleep apnea or symptoms of sleep apnea:  None    Diet:  Regular (no restrictions)    Frequency of exercise:  2-3 days/week    Duration of exercise:  45-60 minutes    Taking medications regularly:  Yes    Medication side effects:  None    Additional concerns today:  No    Vikas retired in February.  He went through a lot of life changes.  He says he is working on a healthier lifestyle: Eating less, working out more, and watching how much alcohol he drinks.  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  When he is not traveling-something he tends to do frequent ly-he spends time in the [facility].    Has been traveling non-stop: Came back from [place] and \"they thought I have a hematoma - cut leg open.\" I and D done to remove clot, also treated for cellulitis at that visit ED note 1/20/23:  47 year old male presents for evaluation of pain, redness, swelling to the left thigh over the past 3 days.  Patient initially presented for concern of possible blood clot/DVT as he had recently traveled from the [place], tested positive for COVID on 12/29/2022, though COVID symptoms have subsequently resolved He had had a covid-19 booster in [facility].    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    Wt Readings from Last 5 Encounters:   11/07/23 131.1 kg (289 lb)   01/20/23 136.1 kg (300 lb)   10/04/22 138.8 kg (306 lb)   09/26/22 138.8 kg (306 lb)   05/13/22 140.6 kg (309 lb 14.4 oz)       Ongoing issues  Hypertension: well controlled    BP Readings from Last 6 Encounters:   11/07/23 134/82   01/20/23 136/84   10/05/22 114/72   09/26/22 139/85   05/13/22 139/80   03/16/22 (!) 145/93      Heart Murmur - I sent him for an echocardiogram two years ago and then referred to cardiology, but he was never able to pursue this referral    Taking more supplemental energy drinks to get through the workout - marginal difference compared to 5 years    10/29/21:  Interpretation Summary     1. Left ventricular size is normal. Mild concentric increase in wall  thickness. Systolic function is normal. The calculated left ventricular  ejection fraction is 68%.  2. Right ventricular size and systolic function are normal.  3. The aortic valve is not well visualized but is possibly bicuspid. There is  mild leaflet calcification. There is mild aortic stenosis with a peak forward  velocity of 2.4 m/s, mean gradient 13 mm Hg, calculated aortic valve area 1.2  cm2, and dimensionless index 0.4. Trivial regurgitation.  4. Normal dimensions of the ascending aorta.  5. No prior study available for comparison.  __________________________________        Albuterol prescription?  Vikas wondered about getting a refill.   He does not have a diagnosis of asthma.  He says that a while ago he was coughing, broke out in a rash, saw an allergist and did a whole bunch of tests -they could not figure out an allergy.  However he got an  "inhaler at the time and he has not used it in a while.    This winter starting to cough a bit more - and he does notice his own tendency to cough more during the cold months.  He does not wheeze or feel short of breath.  His cough is more of a dry cough.  From April to November he is not coughing.    I discussed that the cough does not diagnose asthma.  Other possibilities could include winter allergies like dust mites    Have you ever done Advance Care Planning? (For example, a Health Directive, POLST, or a discussion with a medical provider or your loved ones about your wishes): No, advance care planning information given to patient to review.  Patient plans to discuss their wishes with loved ones or provider.      Social History     Tobacco Use    Smoking status: Never    Smokeless tobacco: Never   Substance Use Topics    Alcohol use: Yes     Comment: moderate             11/7/2023     8:07 AM   Alcohol Use   Prescreen: >3 drinks/day or >7 drinks/week? No       Last PSA: No results found for: \"PSA\"    Reviewed orders with patient. Reviewed health maintenance and updated orders accordingly - Yes      Reviewed and updated as needed this visit by clinical staff   Tobacco  Allergies  Meds              Reviewed and updated as needed this visit by Provider                     Review of Systems   Constitutional:  Negative for chills and fever.   HENT:  Positive for congestion. Negative for ear pain, hearing loss and sore throat.    Eyes:  Negative for pain and visual disturbance.   Respiratory:  Positive for cough. Negative for shortness of breath.    Cardiovascular:  Negative for chest pain, palpitations and peripheral edema.   Gastrointestinal:  Negative for abdominal pain, constipation, diarrhea, heartburn, hematochezia and nausea.   Genitourinary:  Negative for dysuria, frequency, genital sores, hematuria and urgency.   Musculoskeletal:  Negative for arthralgias, joint swelling and myalgias.   Skin:  Negative for " "rash.   Neurological:  Negative for dizziness, weakness, headaches and paresthesias.   Psychiatric/Behavioral:  Negative for mood changes. The patient is not nervous/anxious.         OBJECTIVE:   /82 (BP Location: Left arm, Patient Position: Sitting, Cuff Size: Adult Regular)   Pulse 87   Temp 98.3  F (36.8  C) (Tympanic)   Resp 11   Ht 1.905 m (6' 3\")   Wt 131.1 kg (289 lb)   SpO2 98%   BMI 36.12 kg/m      Physical Exam  General appearance - alert, well appearing, and in no distress  Mental status - normal mood, behavior, speech, dress, motor activity, and thought processes  Eyes - pupils equal and reactive, extraocular eye movements intact, funduscopic exam normal, discs flat and sharp  Ears - bilateral TM's and external ear canals normal  Mouth - mucous membranes moist, pharynx normal without lesions  Neck - supple, no significant adenopathy, carotids upstroke normal bilaterally, no bruits, thyroid exam: thyroid is normal in size without nodules or tenderness  Chest - clear to auscultation, no wheezes, rales or rhonchi, symmetric air entry  Heart - normal rate, regular rhythm, normal S1, S2; 2.5/6 systolic murmur heard throughout the precordium, best at RUSB; no rubs, clicks or gallops  Abdomen - soft, nontender, nondistended, no masses or organomegaly  Neurological - alert, oriented, normal speech, no focal findings or movement disorder noted, DTR's normal and symmetric  Extremities - peripheral pulses normal, no pedal edema, no clubbing or cyanosis  Skin - no rashes or worrisome lesions.  Fungal nail changes of all toenails    "

## 2023-11-09 DIAGNOSIS — R73.01 ELEVATED FASTING BLOOD SUGAR: Primary | ICD-10-CM

## 2023-12-05 DIAGNOSIS — I10 BENIGN ESSENTIAL HYPERTENSION: ICD-10-CM

## 2023-12-05 RX ORDER — AMLODIPINE BESYLATE 10 MG/1
TABLET ORAL
Qty: 90 TABLET | Refills: 2 | OUTPATIENT
Start: 2023-12-05

## 2023-12-05 RX ORDER — LISINOPRIL 10 MG/1
10 TABLET ORAL DAILY
Qty: 90 TABLET | Refills: 0 | Status: SHIPPED | OUTPATIENT
Start: 2023-12-05 | End: 2024-04-09

## 2023-12-06 RX ORDER — AMLODIPINE BESYLATE 10 MG/1
10 TABLET ORAL DAILY
Qty: 90 TABLET | Refills: 2 | OUTPATIENT
Start: 2023-12-06

## 2023-12-09 DIAGNOSIS — I10 BENIGN ESSENTIAL HYPERTENSION: ICD-10-CM

## 2023-12-11 RX ORDER — AMLODIPINE BESYLATE 10 MG/1
TABLET ORAL
Qty: 90 TABLET | Refills: 2 | OUTPATIENT
Start: 2023-12-11

## 2023-12-11 NOTE — TELEPHONE ENCOUNTER
Pt called to check status as he is leaving country on Friday and needs this filled prior to then.    Please advise when sent

## 2023-12-11 NOTE — TELEPHONE ENCOUNTER
Please call patient. There should be refills on file for this at his pharmacy.     Fatimah MIKEN, RN

## 2023-12-11 NOTE — TELEPHONE ENCOUNTER
Pt called back, states [facility] refusing to fill not stating there are any refills on file.     Please call pt once resolved

## 2023-12-11 NOTE — TELEPHONE ENCOUNTER
Routing refill request to provider for review/approval because:  Early refill request. Pt reporting that pharmacy does not have a refill available for patient to  at the pharmacy.       Last Written Prescription Date:  5/19/23 - prescribing provider: Dr. Mares  Last Fill Quantity: 90,  # refills: 2   Last office visit provider:  11/7/23 - office visit for routine physical with Dr. Mares.

## 2023-12-12 RX ORDER — AMLODIPINE BESYLATE 10 MG/1
10 TABLET ORAL DAILY
Qty: 90 TABLET | Refills: 2 | Status: SHIPPED | OUTPATIENT
Start: 2023-12-12

## 2024-04-09 DIAGNOSIS — I10 BENIGN ESSENTIAL HYPERTENSION: ICD-10-CM

## 2024-04-09 RX ORDER — LISINOPRIL 10 MG/1
10 TABLET ORAL DAILY
Qty: 90 TABLET | Refills: 0 | Status: SHIPPED | OUTPATIENT
Start: 2024-04-09 | End: 2024-07-16

## 2024-07-16 DIAGNOSIS — I10 BENIGN ESSENTIAL HYPERTENSION: ICD-10-CM

## 2024-07-16 RX ORDER — LISINOPRIL 10 MG/1
10 TABLET ORAL DAILY
Qty: 90 TABLET | Refills: 0 | Status: SHIPPED | OUTPATIENT
Start: 2024-07-16

## 2024-07-16 RX ORDER — AMLODIPINE BESYLATE 10 MG/1
10 TABLET ORAL DAILY
Qty: 90 TABLET | Refills: 2 | OUTPATIENT
Start: 2024-07-16

## 2024-09-06 ENCOUNTER — TELEPHONE (OUTPATIENT)
Dept: FAMILY MEDICINE | Facility: CLINIC | Age: 49
End: 2024-09-06
Payer: COMMERCIAL

## 2024-09-06 DIAGNOSIS — N20.0 KIDNEY STONE: Primary | ICD-10-CM

## 2024-09-06 RX ORDER — TAMSULOSIN HYDROCHLORIDE 0.4 MG/1
0.4 CAPSULE ORAL DAILY
Qty: 30 CAPSULE | Refills: 0 | Status: SHIPPED | OUTPATIENT
Start: 2024-09-06 | End: 2024-10-06

## 2024-09-07 DIAGNOSIS — N20.0 KIDNEY STONE: ICD-10-CM

## 2024-09-10 RX ORDER — TAMSULOSIN HYDROCHLORIDE 0.4 MG/1
0.4 CAPSULE ORAL DAILY
Qty: 90 CAPSULE | OUTPATIENT
Start: 2024-09-10

## 2024-09-26 ENCOUNTER — TRANSFERRED RECORDS (OUTPATIENT)
Dept: HEALTH INFORMATION MANAGEMENT | Facility: CLINIC | Age: 49
End: 2024-09-26
Payer: COMMERCIAL

## 2024-10-07 PROBLEM — Z87.442 PERSONAL HISTORY OF RENAL CALCULI: Status: ACTIVE | Noted: 2024-10-07

## 2024-10-07 PROBLEM — I10 BENIGN ESSENTIAL HYPERTENSION: Status: ACTIVE | Noted: 2024-10-07

## 2024-10-22 DIAGNOSIS — I10 BENIGN ESSENTIAL HYPERTENSION: ICD-10-CM

## 2024-10-22 RX ORDER — AMLODIPINE BESYLATE 10 MG/1
10 TABLET ORAL DAILY
Qty: 90 TABLET | Refills: 0 | Status: SHIPPED | OUTPATIENT
Start: 2024-10-22

## 2024-10-22 RX ORDER — LISINOPRIL 10 MG/1
10 TABLET ORAL DAILY
Qty: 90 TABLET | Refills: 0 | Status: SHIPPED | OUTPATIENT
Start: 2024-10-22

## 2024-10-22 NOTE — TELEPHONE ENCOUNTER
Pt is out of medication and leaving the country tomorrow morning.      He is hoping to get a years worth refills as well for future as he thought he had refills on file with pharmacy.    Pended for your review.

## 2024-10-23 NOTE — TELEPHONE ENCOUNTER
BP Readings from Last 6 Encounters:   11/07/23 134/82   01/20/23 136/84   10/05/22 114/72   09/26/22 139/85   05/13/22 139/80   03/16/22 (!) 145/93     He may be going out of the country, but his last visit was n November 2023.   Will given 90 days,no futher.  Needs Follow up visit   None

## 2024-12-10 ENCOUNTER — TRANSFERRED RECORDS (OUTPATIENT)
Dept: HEALTH INFORMATION MANAGEMENT | Facility: CLINIC | Age: 49
End: 2024-12-10
Payer: COMMERCIAL

## 2024-12-10 DIAGNOSIS — I10 BENIGN ESSENTIAL HYPERTENSION: ICD-10-CM

## 2024-12-10 RX ORDER — AMLODIPINE BESYLATE 10 MG/1
10 TABLET ORAL DAILY
Qty: 90 TABLET | Refills: 0 | Status: CANCELLED | OUTPATIENT
Start: 2024-12-10

## 2024-12-10 RX ORDER — LISINOPRIL 10 MG/1
10 TABLET ORAL DAILY
Qty: 90 TABLET | Refills: 0 | Status: CANCELLED | OUTPATIENT
Start: 2024-12-10

## 2024-12-11 NOTE — TELEPHONE ENCOUNTER
Refill request 10/22    Me       10/22/24  8:11 PM  Note      BP Readings from Last 6 Encounters:   11/07/23 134/82   01/20/23 136/84   10/05/22 114/72   09/26/22 139/85   05/13/22 139/80   03/16/22 (!) 145/93      He may be going out of the country, but his last visit was n November 2023.   Will given 90 days,no futher.  Needs Follow up visit        amLODIPine (NORVASC) 10 MG tablet 90 tablet 0 10/22/2024 -- No   Sig - Route: Take 1 tablet (10 mg) by mouth daily. - Oral   Sent to pharmacy as: amLODIPine Besylate 10 MG Oral Tablet (NORVASC)   Class: E-Prescribe   Order: [id number]   E-Prescribing Status: Receipt confirmed by pharmacy (10/22/2024  8:11 PM CDT)     lisinopril (ZESTRIL) 10 MG tablet 90 tablet 0 10/22/2024 -- No   Sig - Route: Take 1 tablet (10 mg) by mouth daily. - Oral   Sent to pharmacy as: Lisinopril 10 MG Oral Tablet (ZESTRIL)   Class: E-Prescribe   Order: [id number]   E-Prescribing Status: Receipt confirmed by pharmacy (10/22/2024  8:11 PM CDT)       SO:  1) he should not need refills yet and   2) Patient is overdue for annual exam including BP check  I will send FUTURE refills to mail order after I see him

## 2024-12-19 RX ORDER — LISINOPRIL 10 MG/1
10 TABLET ORAL DAILY
Qty: 90 TABLET | Refills: 0 | Status: CANCELLED | OUTPATIENT
Start: 2024-12-19

## 2024-12-19 RX ORDER — AMLODIPINE BESYLATE 10 MG/1
10 TABLET ORAL DAILY
Qty: 90 TABLET | Refills: 0 | Status: CANCELLED | OUTPATIENT
Start: 2024-12-19

## 2025-01-02 DIAGNOSIS — I10 BENIGN ESSENTIAL HYPERTENSION: ICD-10-CM

## 2025-01-02 RX ORDER — AMLODIPINE BESYLATE 10 MG/1
10 TABLET ORAL DAILY
Qty: 90 TABLET | Refills: 0 | Status: CANCELLED | OUTPATIENT
Start: 2025-01-02

## 2025-01-02 RX ORDER — LISINOPRIL 10 MG/1
10 TABLET ORAL DAILY
Qty: 90 TABLET | Refills: 0 | Status: CANCELLED | OUTPATIENT
Start: 2025-01-02

## 2025-01-03 NOTE — TELEPHONE ENCOUNTER
Again, his current refills witll last until 1/19/25 - I will see him before then        amLODIPine (NORVASC) 10 MG tablet 90 tablet 0 10/22/2024 -- No   Sig - Route: Take 1 tablet (10 mg) by mouth daily. - Oral   Sent to pharmacy as: amLODIPine Besylate 10 MG Oral Tablet (NORVASC)   Class: E-Prescribe   Order: [id number]   E-Prescribing Status: Receipt confirmed by pharmacy (10/22/2024  8:11 PM CDT)       lisinopril (ZESTRIL) 10 MG tablet 90 tablet 0 10/22/2024 -- No   Sig - Route: Take 1 tablet (10 mg) by mouth daily. - Oral   Sent to pharmacy as: Lisinopril 10 MG Oral Tablet (ZESTRIL)   Class: E-Prescribe   Order: [id number]   E-Prescribing Status: Receipt confirmed by pharmacy (10/22/2024  8:11 PM CDT)

## 2025-01-10 ENCOUNTER — OFFICE VISIT (OUTPATIENT)
Dept: FAMILY MEDICINE | Facility: CLINIC | Age: 50
End: 2025-01-10
Payer: COMMERCIAL

## 2025-01-10 VITALS
TEMPERATURE: 97.7 F | HEIGHT: 75 IN | BODY MASS INDEX: 38.36 KG/M2 | HEART RATE: 96 BPM | SYSTOLIC BLOOD PRESSURE: 113 MMHG | WEIGHT: 308.5 LBS | RESPIRATION RATE: 17 BRPM | DIASTOLIC BLOOD PRESSURE: 79 MMHG | OXYGEN SATURATION: 97 %

## 2025-01-10 DIAGNOSIS — R82.994 HYPERCALCIURIA: ICD-10-CM

## 2025-01-10 DIAGNOSIS — Z00.00 ROUTINE GENERAL MEDICAL EXAMINATION AT A HEALTH CARE FACILITY: Primary | ICD-10-CM

## 2025-01-10 DIAGNOSIS — I10 BENIGN ESSENTIAL HYPERTENSION: ICD-10-CM

## 2025-01-10 DIAGNOSIS — R94.4 DECREASED GFR: ICD-10-CM

## 2025-01-10 DIAGNOSIS — I35.0 NONRHEUMATIC AORTIC VALVE STENOSIS: ICD-10-CM

## 2025-01-10 LAB
ANION GAP SERPL CALCULATED.3IONS-SCNC: 10 MMOL/L (ref 7–15)
BUN SERPL-MCNC: 16.1 MG/DL (ref 6–20)
CALCIUM SERPL-MCNC: 9.5 MG/DL (ref 8.8–10.4)
CHLORIDE SERPL-SCNC: 102 MMOL/L (ref 98–107)
CREAT SERPL-MCNC: 1.21 MG/DL (ref 0.67–1.17)
EGFRCR SERPLBLD CKD-EPI 2021: 73 ML/MIN/1.73M2
GLUCOSE SERPL-MCNC: 109 MG/DL (ref 70–99)
HCO3 SERPL-SCNC: 26 MMOL/L (ref 22–29)
PHOSPHATE SERPL-MCNC: 3.3 MG/DL (ref 2.5–4.5)
POTASSIUM SERPL-SCNC: 4.1 MMOL/L (ref 3.4–5.3)
PTH-INTACT SERPL-MCNC: 42 PG/ML (ref 15–65)
SODIUM SERPL-SCNC: 138 MMOL/L (ref 135–145)
VIT D+METAB SERPL-MCNC: 21 NG/ML (ref 20–50)

## 2025-01-10 PROCEDURE — 80048 BASIC METABOLIC PNL TOTAL CA: CPT | Performed by: FAMILY MEDICINE

## 2025-01-10 PROCEDURE — 99396 PREV VISIT EST AGE 40-64: CPT | Performed by: FAMILY MEDICINE

## 2025-01-10 PROCEDURE — 82306 VITAMIN D 25 HYDROXY: CPT | Performed by: FAMILY MEDICINE

## 2025-01-10 PROCEDURE — 83970 ASSAY OF PARATHORMONE: CPT | Performed by: FAMILY MEDICINE

## 2025-01-10 PROCEDURE — 36415 COLL VENOUS BLD VENIPUNCTURE: CPT | Performed by: FAMILY MEDICINE

## 2025-01-10 PROCEDURE — 99214 OFFICE O/P EST MOD 30 MIN: CPT | Mod: 25 | Performed by: FAMILY MEDICINE

## 2025-01-10 PROCEDURE — 84100 ASSAY OF PHOSPHORUS: CPT | Performed by: FAMILY MEDICINE

## 2025-01-10 RX ORDER — AMLODIPINE BESYLATE 10 MG/1
10 TABLET ORAL DAILY
Qty: 90 TABLET | Refills: 3 | Status: SHIPPED | OUTPATIENT
Start: 2025-01-10

## 2025-01-10 RX ORDER — LISINOPRIL 10 MG/1
10 TABLET ORAL DAILY
Qty: 90 TABLET | Refills: 3 | Status: SHIPPED | OUTPATIENT
Start: 2025-01-10

## 2025-01-10 SDOH — HEALTH STABILITY: PHYSICAL HEALTH: ON AVERAGE, HOW MANY DAYS PER WEEK DO YOU ENGAGE IN MODERATE TO STRENUOUS EXERCISE (LIKE A BRISK WALK)?: 3 DAYS

## 2025-01-10 SDOH — HEALTH STABILITY: PHYSICAL HEALTH: ON AVERAGE, HOW MANY MINUTES DO YOU ENGAGE IN EXERCISE AT THIS LEVEL?: 120 MIN

## 2025-01-10 ASSESSMENT — PAIN SCALES - GENERAL: PAINLEVEL_OUTOF10: NO PAIN (0)

## 2025-01-10 ASSESSMENT — SOCIAL DETERMINANTS OF HEALTH (SDOH): HOW OFTEN DO YOU GET TOGETHER WITH FRIENDS OR RELATIVES?: ONCE A WEEK

## 2025-01-10 NOTE — PATIENT INSTRUCTIONS
Consider three   - two dose shingles vaccine   - PCV20 (pneumonia vaccine)    Patient Education   Preventive Care Advice   This is general advice given by our system to help you stay healthy. However, your care team may have specific advice just for you. Please talk to your care team about your preventive care needs.  Nutrition  Eat 5 or more servings of fruits and vegetables each day.  Try wheat bread, brown rice and whole grain pasta (instead of white bread, rice, and pasta).  Get enough calcium and vitamin D. Check the label on foods and aim for 100% of the RDA (recommended daily allowance).  Lifestyle  Exercise at least 150 minutes each week  (30 minutes a day, 5 days a week).  Do muscle strengthening activities 2 days a week. These help control your weight and prevent disease.  No smoking.  Wear sunscreen to prevent skin cancer.  Have a dental exam and cleaning every 6 months.  Yearly exams  See your health care team every year to talk about:  Any changes in your health.  Any medicines your care team has prescribed.  Preventive care, family planning, and ways to prevent chronic diseases.  Shots (vaccines)   HPV shots (up to age 26), if you've never had them before.  Hepatitis B shots (up to age 59), if you've never had them before.  COVID-19 shot: Get this shot when it's due.  Flu shot: Get a flu shot every year.  Tetanus shot: Get a tetanus shot every 10 years.  Pneumococcal, hepatitis A, and RSV shots: Ask your care team if you need these based on your risk.  Shingles shot (for age 50 and up)  General health tests  Diabetes screening:  Starting at age 35, Get screened for diabetes at least every 3 years.  If you are younger than age 35, ask your care team if you should be screened for diabetes.  Cholesterol test: At age 39, start having a cholesterol test every 5 years, or more often if advised.  Bone density scan (DEXA): At age 50, ask your care team if you should have this scan for osteoporosis (brittle  bones).  Hepatitis C: Get tested at least once in your life.  STIs (sexually transmitted infections)  Before age 24: Ask your care team if you should be screened for STIs.  After age 24: Get screened for STIs if you're at risk. You are at risk for STIs (including HIV) if:  You are sexually active with more than one person.  You don't use condoms every time.  You or a partner was diagnosed with a sexually transmitted infection.  If you are at risk for HIV, ask about PrEP medicine to prevent HIV.  Get tested for HIV at least once in your life, whether you are at risk for HIV or not.  Cancer screening tests  Cervical cancer screening: If you have a cervix, begin getting regular cervical cancer screening tests starting at age 21.  Breast cancer scan (mammogram): If you've ever had breasts, begin having regular mammograms starting at age 40. This is a scan to check for breast cancer.  Colon cancer screening: It is important to start screening for colon cancer at age 45.  Have a colonoscopy test every 10 years (or more often if you're at risk) Or, ask your provider about stool tests like a FIT test every year or Cologuard test every 3 years.  To learn more about your testing options, visit:   .  For help making a decision, visit:   https://bit.ly/ak84195.  Prostate cancer screening test: If you have a prostate, ask your care team if a prostate cancer screening test (PSA) at age 55 is right for you.  Lung cancer screening: If you are a current or former smoker ages 50 to 80, ask your care team if ongoing lung cancer screenings are right for you.  For informational purposes only. Not to replace the advice of your health care provider. Copyright   2023 [employer]. All rights reserved. Clinically reviewed by the [facility]itions Program. APX 230770 - REV 01/24.  Safer Sex: Care Instructions  Overview  Safer sex is a way to reduce your risk of getting a sexually transmitted infection (STI). It  can also help prevent pregnancy.  Several products can help you practice safer sex and reduce your chance of STIs. One of the best is a condom. There are internal and external condoms. You can use a special rubber sheet (dental dam) for protection during oral sex. Disposable gloves can keep your hands from touching blood, semen, or other body fluids that can carry infections.  Remember that birth control methods such as diaphragms, IUDs, foams, and birth control pills do not stop you from getting STIs.  Follow-up care is a key part of your treatment and safety. Be sure to make and go to all appointments, and call your doctor if you are having problems. It's also a good idea to know your test results and keep a list of the medicines you take.  How can you care for yourself at home?  Think about getting vaccinated to help prevent hepatitis A, hepatitis B, and human papillomavirus (HPV). They can be spread through sex.  Use a condom every time you have sex. Use an external condom, which goes on the penis. Or use an internal condom, which goes into the vagina or anus.  Make sure you use the right size external condom. A condom that's too small can break easily. A condom that's too big can slip off during sex.  Use a new condom each time you have sex. Be careful not to poke a hole in the condom when you open the wrapper.  Don't use an internal condom and an external condom at the same time.  Never use petroleum jelly (such as Vaseline), grease, hand lotion, baby oil, or anything with oil in it. These products can make holes in the condom.  After intercourse, hold the edge of the condom as you remove it. This will help keep semen from spilling out of the condom.  Do not have sex with anyone who has symptoms of an STI, such as sores on the genitals or mouth.  Do not drink a lot of alcohol or use drugs before sex.  Limit your sex partners. Sex with one partner who has sex only with you can reduce your risk of getting an  "STI.  Don't share sex toys. But if you do share them, use a condom and clean the sex toys between each use.  Talk to any partners before you have sex. Talk about what you feel comfortable with and whether you have any boundaries with sex. And find out if your partner or partners may be at risk for any STI. Keep in mind that a person may be able to spread an STI even if they do not have symptoms. You and any partners may want to get tested for STIs.  Where can you learn more?  Go to https://www.[employer].net/patiented  Enter B608 in the search box to learn more about \"Safer Sex: Care Instructions.\"  Current as of: April 30, 2024  Content Version: 14.3    2024 [employer].   Care instructions adapted under license by your healthcare professional. If you have questions about a medical condition or this instruction, always ask your healthcare professional. [employer] disclaims any warranty or liability for your use of this information.       "

## 2025-01-10 NOTE — PROGRESS NOTES
"Preventive Care Visit  [facility]  Catina Mares MD, Family Medicine  Barrett 10, 2025      Assessment & Plan     Routine general medical examination at a health care facility    Decreased GFR -on basic metabolic panels done outside our system  Benign essential hypertension  Controlled on:  - amLODIPine (NORVASC) 10 MG tablet  Dispense: 90 tablet; Refill: 3  - lisinopril (ZESTRIL) 10 MG tablet  Dispense: 90 tablet; Refill: 3  Monitor with:  - BASIC METABOLIC PANEL    Hypercalciuria -notify urology.  He has an upcoming visit with nephrology.  Since we are already doing labs I will continue workup with  Evaluate with:  - Parathyroid Hormone Intact  - Vitamin D Deficiency  - Phosphorus    https://www.ncbi.nlm.nih.gov/books/TGA870352/    Nonrheumatic aortic valve stenosis -he does say that he is a little more tired (not short of breath) with activity than he used to.  Wonders that this is aging.  However I think we need to reevaluate his  Last echocardiogram in 2021 due for follow-up:  - Echocardiogram Complete  Previously referred, and will referred to  - Adult Cardiology [facility] Referral    Return in about 1 year (around 1/10/2026) for Routine preventive, or earlier as needed.      BMI  Estimated body mass index is 38.56 kg/m  as calculated from the following:    Height as of this encounter: 1.905 m (6' 3\").    Weight as of this encounter: 139.9 kg (308 lb 8 oz).   Weight management plan: He had gained some weight due to inactivity when he was dealing with his mother's death and the state.  He is now made a commitment to joining a gym and increasing his exercise    Counseling  Appropriate preventive services were addressed with this patient via screening, questionnaire, or discussion as appropriate for fall prevention, nutrition, physical activity,  weight loss .  Checklist reviewing preventive services available has been given to the patient.  Reviewed patient's diet, addressing concerns " and/or questions.   He is at risk for lack of exercise and has been provided with information to increase physical activity for the benefit of his well-being.           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  H his group has been able to fund some great projects at the [facility]    Vikas reports a number of death in the family last granddad, mum and two cousins.  This means he has not been delayed in addressing preventative care and some other medical issues.    He also notes in the fall had a kidney stone.  At the first visit he was told it would pass in the pain and away.  However he had a recurrence of symptoms and return to the ED, followed by a operation to address his stone.  He says he is going to have an additional surgery in early February.    His urologist noted hypercalciuria and  referred him to nephrology. Vikas takes Vitamin D 10-30 K units  (3-5 pills of  2-5k units vitamin D twice weekly)    Hypertension: Controlled on amlodipine 10 mg and lisinopril 10 mg    BP Readings from Last 6 Encounters:   01/10/25 113/79   11/07/23 134/82   01/20/23 136/84   10/05/22 114/72   09/26/22 139/85   05/13/22 139/80       Aortic stenosis: I heard a heart murmur and previously had him do an echocardiogram in 2021.  He has not been able to pursue previous referrals to cardiology.  Discussed we should redo this echocardiogram and I will repeat the referral    Preventive   - Has colonoscopy in a couple of weeks   - Will get [facility] at [facility]  Directive  Patient does not have a Health Care Directive: Discussed advance care planning with patient; however, patient declined at this time.      1/10/2025   General Health   How would you rate your overall physical health? (!) FAIR   Feel stress (tense, anxious, or unable to sleep) Not at all   Weight has gone up because of all the stuff he had to deal with estate  Now turning a curve where is working out and heating better      1/10/2025   Nutrition   Three or more servings of calcium each day? Yes   Diet: Low salt    Low fat/cholesterol   How many servings of fruit and vegetables per day? 4 or more   How many sweetened beverages each day? 0-1       Multiple values from one day are sorted in reverse-chronological order         1/10/2025   Exercise   Days per week of moderate/strenuous exercise 3 days   Average minutes spent exercising at this level 120 min         1/10/2025   Social Factors   Frequency of gathering with friends or relatives Once a week   Worry food won't last until get money to buy more No   Food not last or not have enough money for food? No   Do you have housing? (Housing is defined as stable permanent housing and does not include staying outside in a car, in a tent, in an abandoned building, in an overnight shelter, or couch-surfing.) Yes   Are you worried about losing your housing? No   Lack of transportation? No   Unable to get utilities (heat,electricity)? No         1/10/2025   Fall Risk   Fallen 2 or more times in the past year? No   Trouble with walking or balance? No          1/10/2025   Dental   Dentist two times every year? Yes         1/10/2025   TB Screening   Were you born outside of the [place]? Yes         Today's PHQ-2 Score:       1/10/2025     1:32 PM   PHQ-2 ( 1999 [employer])   Q1: Little interest or pleasure in doing things 0   Q2: Feeling down, depressed or hopeless 0   PHQ-2 Score 0    Q1: Little interest or pleasure in doing things Not at all   Q2: Feeling down, depressed or  "hopeless Not at all   PHQ-2 Score 0       Patient-reported           1/10/2025   Substance Use   Alcohol more than 3/day or more than 7/wk No   Do you use any other substances recreationally? No   Limits himself to 2-3 dinks - 0 - 3x weekly    Social History     Tobacco Use    Smoking status: Never    Smokeless tobacco: Never   Substance Use Topics    Alcohol use: Yes     Comment: moderate    Drug use: No           1/10/2025   STI Screening   New sexual partner(s) since last STI/HIV test? (!) YES  - declines screening   ASCVD Risk   The 10-year ASCVD risk score (Merly FELTON, et al., 2019) is: 3.4%    Values used to calculate the score:      Age: 49 years      Sex: Male      Is Non- : No      Diabetic: No      Tobacco smoker: No      Systolic Blood Pressure: 113 mmHg      Is BP treated: Yes      HDL Cholesterol: 43 mg/dL      Total Cholesterol: 186 mg/dL            1/10/2025   Contraception/Family Planning   Questions about contraception or family planning No        Reviewed and updated as needed this visit by Provider                         Objective    Exam  /79 (BP Location: Left arm, Patient Position: Sitting, Cuff Size: Adult Large)   Pulse 96   Temp 97.7  F (36.5  C) (Tympanic)   Resp 17   Ht 1.905 m (6' 3\")   Wt 139.9 kg (308 lb 8 oz)   SpO2 97%   BMI 38.56 kg/m     Estimated body mass index is 38.56 kg/m  as calculated from the following:    Height as of this encounter: 1.905 m (6' 3\").    Weight as of this encounter: 139.9 kg (308 lb 8 oz).    Physical Exam  General appearance - alert, well appearing, and in no distress  Mental status - normal mood, behavior, speech, dress, motor activity, and thought processes  Eyes - pupils equal and reactive, extraocular eye movements intact, funduscopic exam normal, discs flat and sharp  Ears - bilateral TM's and external ear canals normal  Mouth - mucous membranes moist, pharynx normal without lesions  Neck - supple, no " significant adenopathy, carotids upstroke normal bilaterally, no bruits, thyroid exam: thyroid is normal in size without nodules or tenderness  Chest - clear to auscultation, no wheezes, rales or rhonchi, symmetric air entry  Heart - normal rate, regular rhythm, normal S1, S2, , rubs, clicks or gallops.  Crescendo 3/6 systolic murmur heard throughout precordium, best right upper sternal border  Abdomen - soft, nontender, nondistended, no masses or organomegaly  Neurological - alert, oriented, normal speech, no focal findings or movement disorder noted, DTR's normal and symmetric  Extremities - peripheral pulses normal, no pedal edema, no clubbing or cyanosis  Skin - no rashes or worrisome lesions          Signed Electronically by: Catina Mares MD

## 2025-01-10 NOTE — LETTER
"January 16, 2025      Vikas Lees  [address]  [place] [address]        Dear Vikas,    I am  to inform you of your test results.    Your test for kidney function and electrolytes is normal.  Although your creatinine is just slightly elevated, your kidney filtering speed is normal.  This is different from the tests you had done by the urologist.    Your blood sugar is flagged as high, but you were not fasting at the time of the blood tests so you can ignore this    I added on the vitamin D, parathyroid and phosphorus to look for other signs of kidney disease as your urologist was worried about it given your high urine calcium level.  Sometimes if your kidneys \"leak\" calcium in the urine, the parathyroid gland senses a lower blood level and raises the parathyroid hormone level.  However your calcium is normal and also your parathyroid hormone level is normal.    You can share these with your nephrologist.  I am sure there are other test they will do but this is a good first start at a workup.    If you have any questions or concerns, please call the clinic at the number listed above.       Sincerely,      Catina Mares MD    Electronically signed      Resulted Orders   BASIC METABOLIC PANEL   Result Value Ref Range    Sodium 138 135 - 145 mmol/L    Potassium 4.1 3.4 - 5.3 mmol/L    Chloride 102 98 - 107 mmol/L    Carbon Dioxide (CO2) 26 22 - 29 mmol/L    Anion Gap 10 7 - 15 mmol/L    Urea Nitrogen 16.1 6.0 - 20.0 mg/dL    Creatinine 1.21 (H) 0.67 - 1.17 mg/dL    GFR Estimate 73 >60 mL/min/1.73m2      Comment:      eGFR calculated using 2021 CKD-EPI equation.    Calcium 9.5 8.8 - 10.4 mg/dL      Comment:      Reference intervals for this test were updated on 7/16/2024 to reflect our healthy population more accurately. There may be differences in the flagging of prior results with similar values performed with this method. Those prior results can be interpreted in the context of the updated " reference intervals.    Glucose 109 (H) 70 - 99 mg/dL   Parathyroid Hormone Intact   Result Value Ref Range    Parathyroid Hormone Intact 42 15 - 65 pg/mL    Narrative    This result was obtained with the Roche Elecsys PTH STAT assay.   This reference range differs from PTH assays used in other [facility] laboratories.   Vitamin D Deficiency   Result Value Ref Range    Vitamin D, Total (25-Hydroxy) 21 20 - 50 ng/mL      Comment:      optimum levels    Narrative    Season, race, dietary intake, and treatment affect the concentration of 25-hydroxy-Vitamin D. Values may decrease during winter months and increase during summer months.    Vitamin D determination is routinely performed by an immunoassay specific for 25 hydroxyvitamin D3.  If an individual is on vitamin D2(ergocalciferol) supplementation, please specify 25 OH vitamin D2 and D3 level determination by LCMSMS test VITD23.     Phosphorus   Result Value Ref Range    Phosphorus 3.3 2.5 - 4.5 mg/dL

## 2025-01-31 ENCOUNTER — HOSPITAL ENCOUNTER (OUTPATIENT)
Dept: CARDIOLOGY | Facility: CLINIC | Age: 50
Discharge: HOME | End: 2025-01-31
Attending: FAMILY MEDICINE | Admitting: FAMILY MEDICINE
Payer: COMMERCIAL

## 2025-01-31 DIAGNOSIS — I35.0 NONRHEUMATIC AORTIC VALVE STENOSIS: ICD-10-CM

## 2025-01-31 PROBLEM — D12.6 COLON ADENOMAS: Status: ACTIVE | Noted: 2025-01-31

## 2025-01-31 LAB — LVEF ECHO: NORMAL

## 2025-01-31 PROCEDURE — 93306 TTE W/DOPPLER COMPLETE: CPT

## 2025-01-31 PROCEDURE — 93306 TTE W/DOPPLER COMPLETE: CPT | Mod: 26 | Performed by: INTERNAL MEDICINE

## 2025-02-03 ENCOUNTER — PATIENT OUTREACH (OUTPATIENT)
Dept: GASTROENTEROLOGY | Facility: CLINIC | Age: 50
End: 2025-02-03
Payer: COMMERCIAL

## 2025-06-10 ENCOUNTER — TRANSFERRED RECORDS (OUTPATIENT)
Dept: HEALTH INFORMATION MANAGEMENT | Facility: CLINIC | Age: 50
End: 2025-06-10
Payer: COMMERCIAL

## 2025-06-20 ENCOUNTER — TRANSFERRED RECORDS (OUTPATIENT)
Dept: HEALTH INFORMATION MANAGEMENT | Facility: CLINIC | Age: 50
End: 2025-06-20
Payer: COMMERCIAL